# Patient Record
Sex: FEMALE | Race: WHITE | NOT HISPANIC OR LATINO | URBAN - METROPOLITAN AREA
[De-identification: names, ages, dates, MRNs, and addresses within clinical notes are randomized per-mention and may not be internally consistent; named-entity substitution may affect disease eponyms.]

---

## 2017-08-06 ENCOUNTER — INPATIENT (INPATIENT)
Facility: HOSPITAL | Age: 82
LOS: 2 days | Discharge: NURSING, REHAB OR LONG-TERM CARE | DRG: 872 | End: 2017-08-09
Admitting: HOSPITALIST
Payer: MEDICARE

## 2017-08-06 VITALS
HEART RATE: 102 BPM | SYSTOLIC BLOOD PRESSURE: 158 MMHG | RESPIRATION RATE: 20 BRPM | HEIGHT: 65 IN | TEMPERATURE: 101 F | OXYGEN SATURATION: 96 % | DIASTOLIC BLOOD PRESSURE: 91 MMHG | WEIGHT: 130.07 LBS

## 2017-08-06 DIAGNOSIS — F05 DELIRIUM DUE TO KNOWN PHYSIOLOGICAL CONDITION: ICD-10-CM

## 2017-08-06 DIAGNOSIS — N30.01 ACUTE CYSTITIS WITH HEMATURIA: ICD-10-CM

## 2017-08-06 DIAGNOSIS — A41.9 SEPSIS, UNSPECIFIED ORGANISM: ICD-10-CM

## 2017-08-06 LAB
ALBUMIN SERPL ELPH-MCNC: 4.2 G/DL — SIGNIFICANT CHANGE UP (ref 3.3–5.2)
ALP SERPL-CCNC: 89 U/L — SIGNIFICANT CHANGE UP (ref 40–120)
ALT FLD-CCNC: 20 U/L — SIGNIFICANT CHANGE UP
ANION GAP SERPL CALC-SCNC: 16 MMOL/L — SIGNIFICANT CHANGE UP (ref 5–17)
APPEARANCE CSF: CLEAR — SIGNIFICANT CHANGE UP
APPEARANCE UR: ABNORMAL
APTT BLD: 29.9 SEC — SIGNIFICANT CHANGE UP (ref 27.5–37.4)
AST SERPL-CCNC: 46 U/L — HIGH
BACTERIA # UR AUTO: ABNORMAL
BASOPHILS # BLD AUTO: 0 K/UL — SIGNIFICANT CHANGE UP (ref 0–0.2)
BASOPHILS NFR BLD AUTO: 0 % — SIGNIFICANT CHANGE UP (ref 0–2)
BILIRUB SERPL-MCNC: 0.5 MG/DL — SIGNIFICANT CHANGE UP (ref 0.4–2)
BILIRUB UR-MCNC: NEGATIVE — SIGNIFICANT CHANGE UP
BUN SERPL-MCNC: 14 MG/DL — SIGNIFICANT CHANGE UP (ref 8–20)
CALCIUM SERPL-MCNC: 9.3 MG/DL — SIGNIFICANT CHANGE UP (ref 8.6–10.2)
CHLORIDE SERPL-SCNC: 96 MMOL/L — LOW (ref 98–107)
CO2 SERPL-SCNC: 25 MMOL/L — SIGNIFICANT CHANGE UP (ref 22–29)
COLOR CSF: SIGNIFICANT CHANGE UP
COLOR SPEC: YELLOW — SIGNIFICANT CHANGE UP
CREAT SERPL-MCNC: 0.72 MG/DL — SIGNIFICANT CHANGE UP (ref 0.5–1.3)
DIFF PNL FLD: ABNORMAL
EPI CELLS # UR: SIGNIFICANT CHANGE UP
GLUCOSE CSF-MCNC: 78 MG/DL — HIGH (ref 40–70)
GLUCOSE SERPL-MCNC: 153 MG/DL — HIGH (ref 70–115)
GLUCOSE UR QL: 50 MG/DL
HCT VFR BLD CALC: 41.4 % — SIGNIFICANT CHANGE UP (ref 37–47)
HGB BLD-MCNC: 13.5 G/DL — SIGNIFICANT CHANGE UP (ref 12–16)
INR BLD: 0.95 RATIO — SIGNIFICANT CHANGE UP (ref 0.88–1.16)
KETONES UR-MCNC: ABNORMAL
LACTATE BLDV-MCNC: 1.4 MMOL/L — SIGNIFICANT CHANGE UP (ref 0.5–2)
LEUKOCYTE ESTERASE UR-ACNC: ABNORMAL
LYMPHOCYTES # BLD AUTO: 0.8 K/UL — LOW (ref 1–4.8)
LYMPHOCYTES # BLD AUTO: 8 % — LOW (ref 20–55)
LYMPHOCYTES # CSF: SIGNIFICANT CHANGE UP % (ref 40–80)
MANUAL DIF COMMENT BLD-IMP: SIGNIFICANT CHANGE UP
MCHC RBC-ENTMCNC: 28.8 PG — SIGNIFICANT CHANGE UP (ref 27–31)
MCHC RBC-ENTMCNC: 32.6 G/DL — SIGNIFICANT CHANGE UP (ref 32–36)
MCV RBC AUTO: 88.5 FL — SIGNIFICANT CHANGE UP (ref 81–99)
MONOCYTES # BLD AUTO: 0.1 K/UL — SIGNIFICANT CHANGE UP (ref 0–0.8)
MONOCYTES NFR BLD AUTO: 2 % — LOW (ref 3–10)
NEUTROPHILS # BLD AUTO: 9.4 K/UL — HIGH (ref 1.8–8)
NEUTROPHILS # CSF: SIGNIFICANT CHANGE UP %
NEUTROPHILS NFR BLD AUTO: 90 % — HIGH (ref 37–73)
NITRITE UR-MCNC: NEGATIVE — SIGNIFICANT CHANGE UP
NRBC NFR CSF: 0 /UL — SIGNIFICANT CHANGE UP (ref 0–5)
PH UR: 8 — SIGNIFICANT CHANGE UP (ref 5–8)
PLAT MORPH BLD: NORMAL — SIGNIFICANT CHANGE UP
PLATELET # BLD AUTO: 220 K/UL — SIGNIFICANT CHANGE UP (ref 150–400)
POTASSIUM SERPL-MCNC: 4.4 MMOL/L — SIGNIFICANT CHANGE UP (ref 3.5–5.3)
POTASSIUM SERPL-SCNC: 4.4 MMOL/L — SIGNIFICANT CHANGE UP (ref 3.5–5.3)
PROT CSF-MCNC: 43 MG/DL — SIGNIFICANT CHANGE UP (ref 15–45)
PROT SERPL-MCNC: 8.2 G/DL — SIGNIFICANT CHANGE UP (ref 6.6–8.7)
PROT UR-MCNC: 30 MG/DL
PROTHROM AB SERPL-ACNC: 10.4 SEC — SIGNIFICANT CHANGE UP (ref 9.8–12.7)
RBC # BLD: 4.68 M/UL — SIGNIFICANT CHANGE UP (ref 4.4–5.2)
RBC # CSF: 1 /CMM — SIGNIFICANT CHANGE UP (ref 0–1)
RBC # FLD: 13.4 % — SIGNIFICANT CHANGE UP (ref 11–15.6)
RBC BLD AUTO: SIGNIFICANT CHANGE UP
RBC CASTS # UR COMP ASSIST: >50 /HPF (ref 0–4)
SODIUM SERPL-SCNC: 137 MMOL/L — SIGNIFICANT CHANGE UP (ref 135–145)
SP GR SPEC: 1.01 — SIGNIFICANT CHANGE UP (ref 1.01–1.02)
TUBE TYPE: SIGNIFICANT CHANGE UP
UROBILINOGEN FLD QL: NEGATIVE MG/DL — SIGNIFICANT CHANGE UP
WBC # BLD: 10.4 K/UL — SIGNIFICANT CHANGE UP (ref 4.8–10.8)
WBC # FLD AUTO: 10.4 K/UL — SIGNIFICANT CHANGE UP (ref 4.8–10.8)
WBC UR QL: ABNORMAL

## 2017-08-06 PROCEDURE — 62270 DX LMBR SPI PNXR: CPT

## 2017-08-06 PROCEDURE — 99285 EMERGENCY DEPT VISIT HI MDM: CPT | Mod: 25

## 2017-08-06 PROCEDURE — 93010 ELECTROCARDIOGRAM REPORT: CPT

## 2017-08-06 PROCEDURE — 70450 CT HEAD/BRAIN W/O DYE: CPT | Mod: 26

## 2017-08-06 PROCEDURE — 71010: CPT | Mod: 26

## 2017-08-06 PROCEDURE — 99223 1ST HOSP IP/OBS HIGH 75: CPT

## 2017-08-06 RX ORDER — ACETAMINOPHEN 500 MG
650 TABLET ORAL ONCE
Qty: 0 | Refills: 0 | Status: COMPLETED | OUTPATIENT
Start: 2017-08-06 | End: 2017-08-06

## 2017-08-06 RX ORDER — SODIUM CHLORIDE 9 MG/ML
3 INJECTION INTRAMUSCULAR; INTRAVENOUS; SUBCUTANEOUS EVERY 8 HOURS
Qty: 0 | Refills: 0 | Status: DISCONTINUED | OUTPATIENT
Start: 2017-08-06 | End: 2017-08-06

## 2017-08-06 RX ORDER — LACTOBACILLUS ACIDOPHILUS 100MM CELL
1 CAPSULE ORAL
Qty: 0 | Refills: 0 | Status: DISCONTINUED | OUTPATIENT
Start: 2017-08-06 | End: 2017-08-09

## 2017-08-06 RX ORDER — ACETAMINOPHEN 500 MG
650 TABLET ORAL EVERY 6 HOURS
Qty: 0 | Refills: 0 | Status: DISCONTINUED | OUTPATIENT
Start: 2017-08-06 | End: 2017-08-09

## 2017-08-06 RX ORDER — SODIUM CHLORIDE 9 MG/ML
1000 INJECTION, SOLUTION INTRAVENOUS
Qty: 0 | Refills: 0 | Status: DISCONTINUED | OUTPATIENT
Start: 2017-08-06 | End: 2017-08-07

## 2017-08-06 RX ORDER — SODIUM CHLORIDE 9 MG/ML
2000 INJECTION INTRAMUSCULAR; INTRAVENOUS; SUBCUTANEOUS ONCE
Qty: 0 | Refills: 0 | Status: COMPLETED | OUTPATIENT
Start: 2017-08-06 | End: 2017-08-06

## 2017-08-06 RX ORDER — CEFTRIAXONE 500 MG/1
1 INJECTION, POWDER, FOR SOLUTION INTRAMUSCULAR; INTRAVENOUS ONCE
Qty: 0 | Refills: 0 | Status: COMPLETED | OUTPATIENT
Start: 2017-08-06 | End: 2017-08-06

## 2017-08-06 RX ORDER — ACETAMINOPHEN 500 MG
650 TABLET ORAL ONCE
Qty: 0 | Refills: 0 | Status: DISCONTINUED | OUTPATIENT
Start: 2017-08-06 | End: 2017-08-07

## 2017-08-06 RX ORDER — CEFTRIAXONE 500 MG/1
1 INJECTION, POWDER, FOR SOLUTION INTRAMUSCULAR; INTRAVENOUS EVERY 24 HOURS
Qty: 0 | Refills: 0 | Status: DISCONTINUED | OUTPATIENT
Start: 2017-08-06 | End: 2017-08-09

## 2017-08-06 RX ADMIN — Medication 650 MILLIGRAM(S): at 17:51

## 2017-08-06 RX ADMIN — CEFTRIAXONE 100 GRAM(S): 500 INJECTION, POWDER, FOR SOLUTION INTRAMUSCULAR; INTRAVENOUS at 17:53

## 2017-08-06 RX ADMIN — SODIUM CHLORIDE 2000 MILLILITER(S): 9 INJECTION INTRAMUSCULAR; INTRAVENOUS; SUBCUTANEOUS at 17:51

## 2017-08-06 NOTE — H&P ADULT - PROBLEM SELECTOR PLAN 1
At this time LP negative, and only possible source at this time appears to be the Urine. Will cont. Rocephin, f/u blood urine cx, cont. probiotics, monitor WBC/temp curve. DVT-P, fall risk, PT for program. DNR/DNI at family request.

## 2017-08-06 NOTE — ED PROVIDER NOTE - MUSCULOSKELETAL, MLM
Spine appears normal, range of motion is not limited, no muscle or joint tenderness; contusion to left anterior shin

## 2017-08-06 NOTE — H&P ADULT - HISTORY OF PRESENT ILLNESS
89 y/o female from home with family. Patient is currently non-verbal and history obtained from Family. Patient at baseline ambulates with walker and is able to communicate with family. She is a retired OR Nurse and has been doing well up until this morning. Patient was noted to have increased confusion after waking up and was sweating. She was noted to have fever and over the course of the day she was getting more confused to the point of now being non-verbal. No reports of photophobia, or neck stiffness. No recent reports of dysuria or frequency. No sick contacts or travel. No changes in medications. In the ED patient with high grade temp, toxic appearing. No appreciable photophobia or neck pain. Patient with no clear source of infection. U/A weakly positive and patient exam and neuro exam not c/w typical UTI. Patient with concern for meningitis s/p LP which was negative. Will be admitted for f/u of cultures, empiric treatment of possible UTI.

## 2017-08-06 NOTE — ED ADULT TRIAGE NOTE - CHIEF COMPLAINT QUOTE
pt lyssa from home, accompanied by son, son states she is weak and not acting herself pt lyssa from home, accompanied by son, son states she is weak and not acting herself, febrile and tachycardic

## 2017-08-06 NOTE — ED PROVIDER NOTE - OBJECTIVE STATEMENT
89 yo female hx of elevated cholesterol; BIB family for 1 day of increasing lethargy, not speaking clearly, not responding like normal; felt warm; rectal temp in triage 101.8; patient delirius and unable to contribute further to hx.

## 2017-08-06 NOTE — ED ADULT NURSE NOTE - OBJECTIVE STATEMENT
pt brought in by ambulance, as per son pt has not been acting herself, she is usually AOX3 but today started with c/o weakness, and being very quite, now pt is nonverbal. Sepsis code called, MD Ledbetter at bedside.

## 2017-08-06 NOTE — ED PROVIDER NOTE - PROGRESS NOTE DETAILS
Lengthy d/w with family at bedside, 3 sons; patient is DNR/DNI as per advanced directives.  Additionaly, discussed at length consideration for lumbar puncture given no obvious source of infection; at this time family wishes to decline LP after further discussion with hospitalist, agreeable to LP; consent signed by son, in chart; see procedure note.

## 2017-08-06 NOTE — ED ADULT NURSE NOTE - CHIEF COMPLAINT QUOTE
pt lyssa from home, accompanied by son, son states she is weak and not acting herself, febrile and tachycardic

## 2017-08-07 DIAGNOSIS — R26.2 DIFFICULTY IN WALKING, NOT ELSEWHERE CLASSIFIED: ICD-10-CM

## 2017-08-07 DIAGNOSIS — N39.0 URINARY TRACT INFECTION, SITE NOT SPECIFIED: ICD-10-CM

## 2017-08-07 LAB
BASOPHILS # BLD AUTO: 0 K/UL — SIGNIFICANT CHANGE UP (ref 0–0.2)
BASOPHILS NFR BLD AUTO: 0.1 % — SIGNIFICANT CHANGE UP (ref 0–2)
EOSINOPHIL # BLD AUTO: 0 K/UL — SIGNIFICANT CHANGE UP (ref 0–0.5)
EOSINOPHIL NFR BLD AUTO: 0.5 % — SIGNIFICANT CHANGE UP (ref 0–6)
GRAM STN FLD: SIGNIFICANT CHANGE UP
HCT VFR BLD CALC: 38.2 % — SIGNIFICANT CHANGE UP (ref 37–47)
HGB BLD-MCNC: 12.2 G/DL — SIGNIFICANT CHANGE UP (ref 12–16)
LYMPHOCYTES # BLD AUTO: 1.2 K/UL — SIGNIFICANT CHANGE UP (ref 1–4.8)
LYMPHOCYTES # BLD AUTO: 15.1 % — LOW (ref 20–55)
MCHC RBC-ENTMCNC: 28.4 PG — SIGNIFICANT CHANGE UP (ref 27–31)
MCHC RBC-ENTMCNC: 31.9 G/DL — LOW (ref 32–36)
MCV RBC AUTO: 89 FL — SIGNIFICANT CHANGE UP (ref 81–99)
MONOCYTES # BLD AUTO: 1.1 K/UL — HIGH (ref 0–0.8)
MONOCYTES NFR BLD AUTO: 13.6 % — HIGH (ref 3–10)
NEUTROPHILS # BLD AUTO: 5.5 K/UL — SIGNIFICANT CHANGE UP (ref 1.8–8)
NEUTROPHILS NFR BLD AUTO: 70.4 % — SIGNIFICANT CHANGE UP (ref 37–73)
PLATELET # BLD AUTO: 216 K/UL — SIGNIFICANT CHANGE UP (ref 150–400)
RBC # BLD: 4.29 M/UL — LOW (ref 4.4–5.2)
RBC # FLD: 13.4 % — SIGNIFICANT CHANGE UP (ref 11–15.6)
SPECIMEN SOURCE: SIGNIFICANT CHANGE UP
WBC # BLD: 7.8 K/UL — SIGNIFICANT CHANGE UP (ref 4.8–10.8)
WBC # FLD AUTO: 7.8 K/UL — SIGNIFICANT CHANGE UP (ref 4.8–10.8)

## 2017-08-07 PROCEDURE — 99233 SBSQ HOSP IP/OBS HIGH 50: CPT

## 2017-08-07 RX ADMIN — SODIUM CHLORIDE 100 MILLILITER(S): 9 INJECTION, SOLUTION INTRAVENOUS at 10:43

## 2017-08-07 RX ADMIN — Medication 1 TABLET(S): at 11:12

## 2017-08-07 RX ADMIN — Medication 1 TABLET(S): at 10:09

## 2017-08-07 RX ADMIN — Medication 1 TABLET(S): at 18:15

## 2017-08-07 RX ADMIN — CEFTRIAXONE 100 GRAM(S): 500 INJECTION, POWDER, FOR SOLUTION INTRAMUSCULAR; INTRAVENOUS at 18:14

## 2017-08-07 RX ADMIN — SODIUM CHLORIDE 100 MILLILITER(S): 9 INJECTION, SOLUTION INTRAVENOUS at 00:35

## 2017-08-07 NOTE — PHYSICAL THERAPY INITIAL EVALUATION ADULT - PLANNED THERAPY INTERVENTIONS, PT EVAL
gait training/postural re-education/strengthening/stairs/balance training/bed mobility training/transfer training

## 2017-08-07 NOTE — SWALLOW BEDSIDE ASSESSMENT ADULT - ASR SWALLOW ASPIRATION MONITOR
gurgly voice/pneumonia/cough/change of breathing pattern/position upright (89Y)/oral hygiene/throat clearing/upper respiratory infection/fever

## 2017-08-07 NOTE — PHYSICAL THERAPY INITIAL EVALUATION ADULT - IMPAIRMENTS CONTRIBUTING TO GAIT DEVIATIONS, PT EVAL
forward lean, requires verbal cues to stand up straight and keep feet within RW./impaired postural control/decreased strength/impaired balance

## 2017-08-07 NOTE — PROGRESS NOTE ADULT - SUBJECTIVE AND OBJECTIVE BOX
INTERVAL HPI/OVERNIGHT EVENTS:    CC: sepsis secondary to UTI, difficulty walking    Chart and course reviewed, more alert, states she feels better, had some burning urination for few days, states its 1971.    Vital Signs Last 24 Hrs  T(C): 36.9 (07 Aug 2017 15:11), Max: 38.4 (06 Aug 2017 17:18)  T(F): 98.4 (07 Aug 2017 15:11), Max: 101.2 (06 Aug 2017 17:18)  HR: 66 (07 Aug 2017 15:11) (61 - 112)  BP: 143/65 (07 Aug 2017 15:11) (107/59 - 160/88)  BP(mean): 77 (07 Aug 2017 02:57) (77 - 85)  RR: 21 (07 Aug 2017 15:11) (18 - 30)  SpO2: 98% (07 Aug 2017 15:11) (90% - 100%)    PHYSICAL EXAM:    GENERAL: Not in distress, alert, oriented to place and person  CHEST/LUNG:b/l air entry, clear  HEART: Regular  ABDOMEN: Soft, BS+  EXTREMITIES:  No edema, tenderness.    MEDICATIONS  (STANDING):  dextrose 5% + sodium chloride 0.9%. 1000 milliLiter(s) (100 mL/Hr) IV Continuous <Continuous>  cefTRIAXone   IVPB 1 Gram(s) IV Intermittent every 24 hours  lactobacillus acidophilus 1 Tablet(s) Oral two times a day with meals  multivitamin 1 Tablet(s) Oral daily    MEDICATIONS  (PRN):  acetaminophen   Tablet 650 milliGRAM(s) Oral every 6 hours PRN For Temp greater than 38.5 C (101.3 F)      Allergies    No Known Allergies    Intolerances          LABS:                          12.2   7.8   )-----------( 216      ( 07 Aug 2017 05:50 )             38.2     08-06    137  |  96<L>  |  14.0  ----------------------------<  153<H>  4.4   |  25.0  |  0.72    Ca    9.3      06 Aug 2017 17:43    TPro  8.2  /  Alb  4.2  /  TBili  0.5  /  DBili  x   /  AST  46<H>  /  ALT  20  /  AlkPhos  89  08-06    PT/INR - ( 06 Aug 2017 17:43 )   PT: 10.4 sec;   INR: 0.95 ratio         PTT - ( 06 Aug 2017 17:43 )  PTT:29.9 sec  Urinalysis Basic - ( 06 Aug 2017 17:51 )    Color: Yellow / Appearance: x / S.010 / pH: x  Gluc: x / Ketone: Small  / Bili: Negative / Urobili: Negative mg/dL   Blood: x / Protein: 30 mg/dL / Nitrite: Negative   Leuk Esterase: Trace / RBC: >50 /HPF / WBC 6-10   Sq Epi: x / Non Sq Epi: Few / Bacteria: Many        RADIOLOGY & ADDITIONAL TESTS:

## 2017-08-07 NOTE — ED ADULT NURSE REASSESSMENT NOTE - NS ED NURSE REASSESS COMMENT FT1
son at bedside, patient refusing to speak at the moment, plan of care explained to family, verbalized understanding.
Pt improvement in mental status. Pt is now a&ox3, speaking coherently, and following commands. Pt vitals are as charted. Dr. Sandoval made aware of pt's improvement. Will continue to monitor.

## 2017-08-07 NOTE — PHYSICAL THERAPY INITIAL EVALUATION ADULT - ADDITIONAL COMMENTS
Pt lives in a house with 4 steps to enter (+) HR and 8 steps to main level (+) HR. Per son - pt uses RW and cane interchangeably. Pt's son, whom she lives with, travels for work. Pt does not leave the house without assistance from family. She has an aide once a week for laundry/cleaning/cooking.

## 2017-08-08 DIAGNOSIS — E83.39 OTHER DISORDERS OF PHOSPHORUS METABOLISM: ICD-10-CM

## 2017-08-08 LAB
-  AMIKACIN: SIGNIFICANT CHANGE UP
-  AMPICILLIN/SULBACTAM: SIGNIFICANT CHANGE UP
-  AMPICILLIN: SIGNIFICANT CHANGE UP
-  AZTREONAM: SIGNIFICANT CHANGE UP
-  CEFAZOLIN: SIGNIFICANT CHANGE UP
-  CEFEPIME: SIGNIFICANT CHANGE UP
-  CEFOXITIN: SIGNIFICANT CHANGE UP
-  CEFTAZIDIME: SIGNIFICANT CHANGE UP
-  CEFTRIAXONE: SIGNIFICANT CHANGE UP
-  CIPROFLOXACIN: SIGNIFICANT CHANGE UP
-  ERTAPENEM: SIGNIFICANT CHANGE UP
-  GENTAMICIN: SIGNIFICANT CHANGE UP
-  IMIPENEM: SIGNIFICANT CHANGE UP
-  LEVOFLOXACIN: SIGNIFICANT CHANGE UP
-  MEROPENEM: SIGNIFICANT CHANGE UP
-  NITROFURANTOIN: SIGNIFICANT CHANGE UP
-  PIPERACILLIN/TAZOBACTAM: SIGNIFICANT CHANGE UP
-  TOBRAMYCIN: SIGNIFICANT CHANGE UP
-  TRIMETHOPRIM/SULFAMETHOXAZOLE: SIGNIFICANT CHANGE UP
ANION GAP SERPL CALC-SCNC: 11 MMOL/L — SIGNIFICANT CHANGE UP (ref 5–17)
BUN SERPL-MCNC: 10 MG/DL — SIGNIFICANT CHANGE UP (ref 8–20)
CALCIUM SERPL-MCNC: 9.1 MG/DL — SIGNIFICANT CHANGE UP (ref 8.6–10.2)
CHLORIDE SERPL-SCNC: 100 MMOL/L — SIGNIFICANT CHANGE UP (ref 98–107)
CO2 SERPL-SCNC: 28 MMOL/L — SIGNIFICANT CHANGE UP (ref 22–29)
CREAT SERPL-MCNC: 0.65 MG/DL — SIGNIFICANT CHANGE UP (ref 0.5–1.3)
CULTURE RESULTS: SIGNIFICANT CHANGE UP
GLUCOSE SERPL-MCNC: 124 MG/DL — HIGH (ref 70–115)
HCT VFR BLD CALC: 42.7 % — SIGNIFICANT CHANGE UP (ref 37–47)
HGB BLD-MCNC: 13.8 G/DL — SIGNIFICANT CHANGE UP (ref 12–16)
LABORATORY COMMENT REPORT: SIGNIFICANT CHANGE UP
MAGNESIUM SERPL-MCNC: 2.3 MG/DL — SIGNIFICANT CHANGE UP (ref 1.6–2.6)
MCHC RBC-ENTMCNC: 28.6 PG — SIGNIFICANT CHANGE UP (ref 27–31)
MCHC RBC-ENTMCNC: 32.3 G/DL — SIGNIFICANT CHANGE UP (ref 32–36)
MCV RBC AUTO: 88.6 FL — SIGNIFICANT CHANGE UP (ref 81–99)
METHOD TYPE: SIGNIFICANT CHANGE UP
ORGANISM # SPEC MICROSCOPIC CNT: SIGNIFICANT CHANGE UP
ORGANISM # SPEC MICROSCOPIC CNT: SIGNIFICANT CHANGE UP
PHOSPHATE SERPL-MCNC: 1.8 MG/DL — LOW (ref 2.4–4.7)
PLATELET # BLD AUTO: 210 K/UL — SIGNIFICANT CHANGE UP (ref 150–400)
POTASSIUM SERPL-MCNC: 3.9 MMOL/L — SIGNIFICANT CHANGE UP (ref 3.5–5.3)
POTASSIUM SERPL-SCNC: 3.9 MMOL/L — SIGNIFICANT CHANGE UP (ref 3.5–5.3)
RBC # BLD: 4.82 M/UL — SIGNIFICANT CHANGE UP (ref 4.4–5.2)
RBC # FLD: 13.3 % — SIGNIFICANT CHANGE UP (ref 11–15.6)
SODIUM SERPL-SCNC: 139 MMOL/L — SIGNIFICANT CHANGE UP (ref 135–145)
SOURCE HSV 1/2: SIGNIFICANT CHANGE UP
SPECIMEN SOURCE: SIGNIFICANT CHANGE UP
WBC # BLD: 6 K/UL — SIGNIFICANT CHANGE UP (ref 4.8–10.8)
WBC # FLD AUTO: 6 K/UL — SIGNIFICANT CHANGE UP (ref 4.8–10.8)

## 2017-08-08 PROCEDURE — 99233 SBSQ HOSP IP/OBS HIGH 50: CPT

## 2017-08-08 RX ORDER — POTASSIUM PHOSPHATE, MONOBASIC POTASSIUM PHOSPHATE, DIBASIC 236; 224 MG/ML; MG/ML
15 INJECTION, SOLUTION INTRAVENOUS ONCE
Qty: 0 | Refills: 0 | Status: COMPLETED | OUTPATIENT
Start: 2017-08-08 | End: 2017-08-08

## 2017-08-08 RX ADMIN — CEFTRIAXONE 100 GRAM(S): 500 INJECTION, POWDER, FOR SOLUTION INTRAMUSCULAR; INTRAVENOUS at 17:35

## 2017-08-08 RX ADMIN — Medication 1 TABLET(S): at 13:00

## 2017-08-08 RX ADMIN — Medication 1 TABLET(S): at 08:04

## 2017-08-08 RX ADMIN — POTASSIUM PHOSPHATE, MONOBASIC POTASSIUM PHOSPHATE, DIBASIC 62.5 MILLIMOLE(S): 236; 224 INJECTION, SOLUTION INTRAVENOUS at 17:36

## 2017-08-08 RX ADMIN — Medication 1 TABLET(S): at 17:36

## 2017-08-08 NOTE — PROGRESS NOTE ADULT - SUBJECTIVE AND OBJECTIVE BOX
INTERVAL HPI/OVERNIGHT EVENTS:    CC: sepsis and altered mental status secondary to UTI improving    No overnight events, denies complaints. Pleasant, mild confusion.    Vital Signs Last 24 Hrs  T(C): 36.7 (08 Aug 2017 08:11), Max: 36.9 (07 Aug 2017 15:11)  T(F): 98 (08 Aug 2017 08:11), Max: 98.4 (07 Aug 2017 15:11)  HR: 98 (08 Aug 2017 08:11) (65 - 98)  BP: 104/56 (08 Aug 2017 08:11) (104/56 - 145/68)  BP(mean): --  RR: 18 (08 Aug 2017 08:11) (17 - 21)  SpO2: 99% (08 Aug 2017 08:11) (98% - 100%)    PHYSICAL EXAM:    GENERAL: Not in distress, alert, confused, oriented to place and person, states it is 1971  CHEST/LUNG: b/l air entry, clear  HEART: Regular   ABDOMEN: Soft, BS+  EXTREMITIES: No edema, tenderness    MEDICATIONS  (STANDING):  cefTRIAXone   IVPB 1 Gram(s) IV Intermittent every 24 hours  lactobacillus acidophilus 1 Tablet(s) Oral two times a day with meals  multivitamin 1 Tablet(s) Oral daily    MEDICATIONS  (PRN):  acetaminophen   Tablet 650 milliGRAM(s) Oral every 6 hours PRN For Temp greater than 38.5 C (101.3 F)      Allergies    No Known Allergies    Intolerances          LABS:                          13.8   6.0   )-----------( 210      ( 08 Aug 2017 11:57 )             42.7     08-    139  |  100  |  10.0  ----------------------------<  124<H>  3.9   |  28.0  |  0.65    Ca    9.1      08 Aug 2017 11:56  Phos  1.8     08-  Mg     2.3     -    TPro  8.2  /  Alb  4.2  /  TBili  0.5  /  DBili  x   /  AST  46<H>  /  ALT  20  /  AlkPhos  89  08-06    PT/INR - ( 06 Aug 2017 17:43 )   PT: 10.4 sec;   INR: 0.95 ratio         PTT - ( 06 Aug 2017 17:43 )  PTT:29.9 sec  Urinalysis Basic - ( 06 Aug 2017 17:51 )    Color: Yellow / Appearance: x / S.010 / pH: x  Gluc: x / Ketone: Small  / Bili: Negative / Urobili: Negative mg/dL   Blood: x / Protein: 30 mg/dL / Nitrite: Negative   Leuk Esterase: Trace / RBC: >50 /HPF / WBC 6-10   Sq Epi: x / Non Sq Epi: Few / Bacteria: Many        RADIOLOGY & ADDITIONAL TESTS:

## 2017-08-08 NOTE — PROGRESS NOTE ADULT - ASSESSMENT
89 yr old female with hyperlipidemia, lives alone admitted with weakness, lethargy and confusion. She was febrile in the ED, initial concern for meningitis was ruled out with LP. UA was weakly positive for infection, hence started on IV Ceftriaxone. Urine cultures grew gram negative rods. Her mental status improved. PT evaluation was done, advised MELIZA. Goals of care discussed with son, stated she is DNR.

## 2017-08-09 ENCOUNTER — TRANSCRIPTION ENCOUNTER (OUTPATIENT)
Age: 82
End: 2017-08-09

## 2017-08-09 VITALS
RESPIRATION RATE: 18 BRPM | SYSTOLIC BLOOD PRESSURE: 113 MMHG | OXYGEN SATURATION: 97 % | TEMPERATURE: 99 F | HEART RATE: 76 BPM | DIASTOLIC BLOOD PRESSURE: 66 MMHG

## 2017-08-09 LAB
ANION GAP SERPL CALC-SCNC: 13 MMOL/L — SIGNIFICANT CHANGE UP (ref 5–17)
BUN SERPL-MCNC: 18 MG/DL — SIGNIFICANT CHANGE UP (ref 8–20)
CALCIUM SERPL-MCNC: 8.6 MG/DL — SIGNIFICANT CHANGE UP (ref 8.6–10.2)
CHLORIDE SERPL-SCNC: 102 MMOL/L — SIGNIFICANT CHANGE UP (ref 98–107)
CO2 SERPL-SCNC: 23 MMOL/L — SIGNIFICANT CHANGE UP (ref 22–29)
CREAT SERPL-MCNC: 0.75 MG/DL — SIGNIFICANT CHANGE UP (ref 0.5–1.3)
GLUCOSE SERPL-MCNC: 106 MG/DL — SIGNIFICANT CHANGE UP (ref 70–115)
HCT VFR BLD CALC: 39.7 % — SIGNIFICANT CHANGE UP (ref 37–47)
HGB BLD-MCNC: 12.8 G/DL — SIGNIFICANT CHANGE UP (ref 12–16)
MAGNESIUM SERPL-MCNC: 2.3 MG/DL — SIGNIFICANT CHANGE UP (ref 1.6–2.6)
MCHC RBC-ENTMCNC: 28.3 PG — SIGNIFICANT CHANGE UP (ref 27–31)
MCHC RBC-ENTMCNC: 32.2 G/DL — SIGNIFICANT CHANGE UP (ref 32–36)
MCV RBC AUTO: 87.6 FL — SIGNIFICANT CHANGE UP (ref 81–99)
PHOSPHATE SERPL-MCNC: 3.8 MG/DL — SIGNIFICANT CHANGE UP (ref 2.4–4.7)
PLATELET # BLD AUTO: 225 K/UL — SIGNIFICANT CHANGE UP (ref 150–400)
POTASSIUM SERPL-MCNC: 4.3 MMOL/L — SIGNIFICANT CHANGE UP (ref 3.5–5.3)
POTASSIUM SERPL-SCNC: 4.3 MMOL/L — SIGNIFICANT CHANGE UP (ref 3.5–5.3)
RBC # BLD: 4.53 M/UL — SIGNIFICANT CHANGE UP (ref 4.4–5.2)
RBC # FLD: 13.3 % — SIGNIFICANT CHANGE UP (ref 11–15.6)
SODIUM SERPL-SCNC: 138 MMOL/L — SIGNIFICANT CHANGE UP (ref 135–145)
WBC # BLD: 6.5 K/UL — SIGNIFICANT CHANGE UP (ref 4.8–10.8)
WBC # FLD AUTO: 6.5 K/UL — SIGNIFICANT CHANGE UP (ref 4.8–10.8)

## 2017-08-09 PROCEDURE — 99239 HOSP IP/OBS DSCHRG MGMT >30: CPT

## 2017-08-09 RX ORDER — ACETAMINOPHEN 500 MG
2 TABLET ORAL
Qty: 0 | Refills: 0 | COMMUNITY
Start: 2017-08-09

## 2017-08-09 RX ORDER — LACTOBACILLUS ACIDOPHILUS 100MM CELL
1 CAPSULE ORAL
Qty: 0 | Refills: 0 | COMMUNITY
Start: 2017-08-09

## 2017-08-09 RX ADMIN — Medication 1 TABLET(S): at 11:54

## 2017-08-09 RX ADMIN — Medication 1 TABLET(S): at 07:52

## 2017-08-09 NOTE — DISCHARGE NOTE ADULT - MEDICATION SUMMARY - MEDICATIONS TO TAKE
I will START or STAY ON the medications listed below when I get home from the hospital:    aspirin 81 mg oral tablet  -- 1 tab(s) by mouth once a day  -- Indication: For High cholesterol    acetaminophen 325 mg oral tablet  -- 2 tab(s) by mouth every 6 hours, As needed, For Temp greater than 38.5 C (101.3 F)  -- Indication: For fever    simvastatin 40 mg oral tablet  -- 1 tab(s) by mouth once a day (at bedtime)  -- Indication: For High cholesterol    Vantin  -- 200 milligram(s) by mouth once a day for 5 days  -- Indication: For Acute cystitis with hematuria    lactobacillus acidophilus oral capsule  -- 1 cap(s) by mouth once a day for 10 days  -- Indication: For probiotic    Multiple Vitamins oral tablet  -- 1 tab(s) by mouth once a day  -- Indication: For Supplement    Ocuvite Eye + Multi oral tablet  -- 1 tab(s) by mouth once a day  -- Indication: For Supplement

## 2017-08-09 NOTE — DISCHARGE NOTE ADULT - CARE PLAN
Principal Discharge DX:	Acute cystitis with hematuria  Goal:	Resolution of infection  Instructions for follow-up, activity and diet:	Complete course of antibiotics.  Secondary Diagnosis:	Difficulty walking  Instructions for follow-up, activity and diet:	Continue PT at [facility].  Secondary Diagnosis:	High cholesterol  Instructions for follow-up, activity and diet:	Continue statin. Principal Discharge DX:	Acute cystitis with hematuria  Goal:	Resolution of infection  Instructions for follow-up, activity and diet:	Complete course of antibiotics.  Secondary Diagnosis:	Difficulty walking  Instructions for follow-up, activity and diet:	Continue PT at [facility].  Secondary Diagnosis:	High cholesterol  Instructions for follow-up, activity and diet:	Continue statin.

## 2017-08-09 NOTE — DISCHARGE NOTE ADULT - PATIENT PORTAL LINK FT
“You can access the [facility] Patient Portal, offered by [facility], by registering with the following website: [email or web address]”

## 2017-08-09 NOTE — PROGRESS NOTE ADULT - ASSESSMENT
89 yr old female with hyperlipidemia, lives alone admitted with weakness, lethargy and confusion. She was febrile in the ED, initial concern for meningitis was ruled out with LP. UA was weakly positive for infection, hence started on IV Ceftriaxone. Urine cultures grew gram negative rods. Her mental status improved. PT evaluation was done, advised MELIZA. Goals of care discussed with son, stated she is DNR. Her urine cultures grew E coli and blood cultures were negative. She improved clinically, family in agreement with MELIZA.

## 2017-08-09 NOTE — PROGRESS NOTE ADULT - PROBLEM SELECTOR PROBLEM 1
Sepsis, due to unspecified organism

## 2017-08-09 NOTE — DISCHARGE NOTE ADULT - HOSPITAL COURSE
89 yr old female with hyperlipidemia, lives alone admitted with weakness, lethargy and confusion. She was febrile in the ED, initial concern for meningitis was ruled out with LP. UA was weakly positive for infection, hence started on IV Ceftriaxone. Urine cultures grew gram negative rods. Her mental status improved. PT evaluation was done, advised [facility]. Goals of care discussed with son, stated she is DNR. Her urine cultures grew E coli and blood cultures were negative. She improved clinically, family in agreement with [facility]. She is stable for discharge to [facility].    Spent > 35 mins in discharge plan and documentation.

## 2017-08-09 NOTE — PROGRESS NOTE ADULT - SUBJECTIVE AND OBJECTIVE BOX
INTERVAL HPI/OVERNIGHT EVENTS:    CC: sepsis secondary to UTI improving    No overnight events, denies complaints.     Vital Signs Last 24 Hrs  T(C): 37.2 (09 Aug 2017 09:47), Max: 37.2 (09 Aug 2017 09:47)  T(F): 98.9 (09 Aug 2017 09:47), Max: 98.9 (09 Aug 2017 09:47)  HR: 76 (09 Aug 2017 09:47) (74 - 98)  BP: 113/66 (09 Aug 2017 09:47) (113/66 - 119/62)  BP(mean): --  RR: 18 (09 Aug 2017 09:47) (17 - 18)  SpO2: 97% (09 Aug 2017 09:47) (97% - 98%)    PHYSICAL EXAM:    GENERAL: not in distress, alert  CHEST/LUNG: b/l air entry  HEART: Regular   ABDOMEN: Soft, BS+  EXTREMITIES:  No edema, tenderness    MEDICATIONS  (STANDING):  cefTRIAXone   IVPB 1 Gram(s) IV Intermittent every 24 hours  lactobacillus acidophilus 1 Tablet(s) Oral two times a day with meals  multivitamin 1 Tablet(s) Oral daily    MEDICATIONS  (PRN):  acetaminophen   Tablet 650 milliGRAM(s) Oral every 6 hours PRN For Temp greater than 38.5 C (101.3 F)      Allergies    No Known Allergies    Intolerances          LABS:                          12.8   6.5   )-----------( 225      ( 09 Aug 2017 07:28 )             39.7     08-09    138  |  102  |  18.0  ----------------------------<  106  4.3   |  23.0  |  0.75    Ca    8.6      09 Aug 2017 07:29  Phos  3.8     08-09  Mg     2.3     08-09            RADIOLOGY & ADDITIONAL TESTS:

## 2017-08-09 NOTE — PROGRESS NOTE ADULT - PROBLEM SELECTOR PLAN 2
Improving, secondary to underlying sepsis. ? dementia.
Resolved.
Likely from underlying sepsis, improving.

## 2017-08-09 NOTE — PROGRESS NOTE ADULT - PROBLEM SELECTOR PLAN 4
Eventual MELIZA.
Needs MELIZA, discussed with social work.
Needs MELIZA, PT evaluation noted. Lives alone.

## 2017-08-09 NOTE — PROGRESS NOTE ADULT - PROBLEM SELECTOR PLAN 1
Secondary to UTI, continue Ceftriaxone, follow up urine cultures.
Will switch to oral antibiotics.
Likely from underlying UTI, continue Ceftriaxone. Follow up urine and blood cultures.

## 2017-08-09 NOTE — PROGRESS NOTE ADULT - ATTENDING COMMENTS
Plan of care discussed with patient's daughter in law. Anticipate discharge to [facility] in 24-48 hrs.
Stable for discharge to [facility], spoke with SW and daughter in law.
Spoke with son Carlos regarding plan of care, he verbalized patient is DNR.

## 2017-08-09 NOTE — PROGRESS NOTE ADULT - PROBLEM SELECTOR PROBLEM 2
Delirium due to another medical condition

## 2017-08-09 NOTE — DISCHARGE NOTE ADULT - ADDITIONAL INSTRUCTIONS
Complete course of antibiotics, continue PT at [facility]. Return to ED should symptoms recur/worsen.

## 2017-08-09 NOTE — DISCHARGE NOTE ADULT - PLAN OF CARE
Resolution of infection Complete course of antibiotics. Continue PT at [facility]. Continue statin.

## 2017-08-10 LAB
CULTURE RESULTS: SIGNIFICANT CHANGE UP
SPECIMEN SOURCE: SIGNIFICANT CHANGE UP

## 2017-08-11 LAB
CULTURE RESULTS: SIGNIFICANT CHANGE UP
CULTURE RESULTS: SIGNIFICANT CHANGE UP
SPECIMEN SOURCE: SIGNIFICANT CHANGE UP
SPECIMEN SOURCE: SIGNIFICANT CHANGE UP

## 2017-09-12 RX ORDER — MULTIVIT-MIN/FERROUS GLUCONATE 9 MG/15 ML
1 LIQUID (ML) ORAL
Qty: 0 | Refills: 0 | COMMUNITY

## 2018-04-23 ENCOUNTER — INPATIENT (INPATIENT)
Facility: HOSPITAL | Age: 83
LOS: 1 days | Discharge: HOME | DRG: 312 | End: 2018-04-25
Attending: HOSPITALIST | Admitting: HOSPITALIST
Payer: MEDICARE

## 2018-04-23 VITALS
OXYGEN SATURATION: 97 % | SYSTOLIC BLOOD PRESSURE: 132 MMHG | WEIGHT: 130.07 LBS | DIASTOLIC BLOOD PRESSURE: 75 MMHG | HEART RATE: 86 BPM | TEMPERATURE: 98 F | HEIGHT: 64 IN | RESPIRATION RATE: 20 BRPM

## 2018-04-23 DIAGNOSIS — R55 SYNCOPE AND COLLAPSE: ICD-10-CM

## 2018-04-23 PROBLEM — E78.00 PURE HYPERCHOLESTEROLEMIA, UNSPECIFIED: Chronic | Status: ACTIVE | Noted: 2017-08-06

## 2018-04-23 LAB
ALBUMIN SERPL ELPH-MCNC: 4.1 G/DL — SIGNIFICANT CHANGE UP (ref 3.3–5.2)
ALP SERPL-CCNC: 85 U/L — SIGNIFICANT CHANGE UP (ref 40–120)
ALT FLD-CCNC: 15 U/L — SIGNIFICANT CHANGE UP
ANION GAP SERPL CALC-SCNC: 10 MMOL/L — SIGNIFICANT CHANGE UP (ref 5–17)
APPEARANCE UR: CLEAR — SIGNIFICANT CHANGE UP
AST SERPL-CCNC: 35 U/L — HIGH
BILIRUB SERPL-MCNC: 0.3 MG/DL — LOW (ref 0.4–2)
BILIRUB UR-MCNC: NEGATIVE — SIGNIFICANT CHANGE UP
BUN SERPL-MCNC: 22 MG/DL — HIGH (ref 8–20)
CALCIUM SERPL-MCNC: 9.6 MG/DL — SIGNIFICANT CHANGE UP (ref 8.6–10.2)
CHLORIDE SERPL-SCNC: 99 MMOL/L — SIGNIFICANT CHANGE UP (ref 98–107)
CK MB CFR SERPL CALC: 3.3 NG/ML — SIGNIFICANT CHANGE UP (ref 0–6.7)
CK SERPL-CCNC: 220 U/L — HIGH (ref 25–170)
CO2 SERPL-SCNC: 29 MMOL/L — SIGNIFICANT CHANGE UP (ref 22–29)
COLOR SPEC: YELLOW — SIGNIFICANT CHANGE UP
CREAT SERPL-MCNC: 0.98 MG/DL — SIGNIFICANT CHANGE UP (ref 0.5–1.3)
DIFF PNL FLD: NEGATIVE — SIGNIFICANT CHANGE UP
EPI CELLS # UR: SIGNIFICANT CHANGE UP
GLUCOSE SERPL-MCNC: 108 MG/DL — SIGNIFICANT CHANGE UP (ref 70–115)
GLUCOSE UR QL: NEGATIVE MG/DL — SIGNIFICANT CHANGE UP
HCT VFR BLD CALC: 39.9 % — SIGNIFICANT CHANGE UP (ref 37–47)
HGB BLD-MCNC: 12.5 G/DL — SIGNIFICANT CHANGE UP (ref 12–16)
KETONES UR-MCNC: NEGATIVE — SIGNIFICANT CHANGE UP
LEUKOCYTE ESTERASE UR-ACNC: ABNORMAL
MAGNESIUM SERPL-MCNC: 2.4 MG/DL — SIGNIFICANT CHANGE UP (ref 1.6–2.6)
MCHC RBC-ENTMCNC: 27.8 PG — SIGNIFICANT CHANGE UP (ref 27–31)
MCHC RBC-ENTMCNC: 31.3 G/DL — LOW (ref 32–36)
MCV RBC AUTO: 88.9 FL — SIGNIFICANT CHANGE UP (ref 81–99)
NITRITE UR-MCNC: NEGATIVE — SIGNIFICANT CHANGE UP
NT-PROBNP SERPL-SCNC: 154 PG/ML — SIGNIFICANT CHANGE UP (ref 0–300)
PH UR: 8 — SIGNIFICANT CHANGE UP (ref 5–8)
PHOSPHATE SERPL-MCNC: 2.8 MG/DL — SIGNIFICANT CHANGE UP (ref 2.4–4.7)
PLATELET # BLD AUTO: 208 K/UL — SIGNIFICANT CHANGE UP (ref 150–400)
POTASSIUM SERPL-MCNC: 4.6 MMOL/L — SIGNIFICANT CHANGE UP (ref 3.5–5.3)
POTASSIUM SERPL-SCNC: 4.6 MMOL/L — SIGNIFICANT CHANGE UP (ref 3.5–5.3)
PROT SERPL-MCNC: 7.8 G/DL — SIGNIFICANT CHANGE UP (ref 6.6–8.7)
PROT UR-MCNC: NEGATIVE MG/DL — SIGNIFICANT CHANGE UP
RBC # BLD: 4.49 M/UL — SIGNIFICANT CHANGE UP (ref 4.4–5.2)
RBC # FLD: 13.7 % — SIGNIFICANT CHANGE UP (ref 11–15.6)
RBC CASTS # UR COMP ASSIST: NEGATIVE /HPF — SIGNIFICANT CHANGE UP (ref 0–4)
SODIUM SERPL-SCNC: 138 MMOL/L — SIGNIFICANT CHANGE UP (ref 135–145)
SP GR SPEC: 1.01 — SIGNIFICANT CHANGE UP (ref 1.01–1.02)
TROPONIN T SERPL-MCNC: <0.01 NG/ML — SIGNIFICANT CHANGE UP (ref 0–0.06)
UROBILINOGEN FLD QL: NEGATIVE MG/DL — SIGNIFICANT CHANGE UP
WBC # BLD: 7.7 K/UL — SIGNIFICANT CHANGE UP (ref 4.8–10.8)
WBC # FLD AUTO: 7.7 K/UL — SIGNIFICANT CHANGE UP (ref 4.8–10.8)
WBC UR QL: SIGNIFICANT CHANGE UP

## 2018-04-23 PROCEDURE — 71045 X-RAY EXAM CHEST 1 VIEW: CPT | Mod: 26

## 2018-04-23 PROCEDURE — 70450 CT HEAD/BRAIN W/O DYE: CPT | Mod: 26

## 2018-04-23 PROCEDURE — 72125 CT NECK SPINE W/O DYE: CPT | Mod: 26

## 2018-04-23 PROCEDURE — 99284 EMERGENCY DEPT VISIT MOD MDM: CPT

## 2018-04-23 RX ORDER — SIMVASTATIN 20 MG/1
40 TABLET, FILM COATED ORAL AT BEDTIME
Qty: 0 | Refills: 0 | Status: DISCONTINUED | OUTPATIENT
Start: 2018-04-23 | End: 2018-04-25

## 2018-04-23 RX ORDER — ATORVASTATIN CALCIUM 80 MG/1
40 TABLET, FILM COATED ORAL AT BEDTIME
Qty: 0 | Refills: 0 | Status: DISCONTINUED | OUTPATIENT
Start: 2018-04-23 | End: 2018-04-23

## 2018-04-23 RX ORDER — ENOXAPARIN SODIUM 100 MG/ML
40 INJECTION SUBCUTANEOUS EVERY 24 HOURS
Qty: 0 | Refills: 0 | Status: DISCONTINUED | OUTPATIENT
Start: 2018-04-23 | End: 2018-04-25

## 2018-04-23 RX ORDER — ASPIRIN/CALCIUM CARB/MAGNESIUM 324 MG
81 TABLET ORAL DAILY
Qty: 0 | Refills: 0 | Status: DISCONTINUED | OUTPATIENT
Start: 2018-04-23 | End: 2018-04-23

## 2018-04-23 RX ORDER — PIPERACILLIN AND TAZOBACTAM 4; .5 G/20ML; G/20ML
200 INJECTION, POWDER, LYOPHILIZED, FOR SOLUTION INTRAVENOUS
Qty: 0 | Refills: 0 | COMMUNITY

## 2018-04-23 RX ORDER — ASPIRIN/CALCIUM CARB/MAGNESIUM 324 MG
81 TABLET ORAL DAILY
Qty: 0 | Refills: 0 | Status: DISCONTINUED | OUTPATIENT
Start: 2018-04-23 | End: 2018-04-25

## 2018-04-23 RX ADMIN — ATORVASTATIN CALCIUM 40 MILLIGRAM(S): 80 TABLET, FILM COATED ORAL at 21:52

## 2018-04-23 RX ADMIN — Medication 81 MILLIGRAM(S): at 21:52

## 2018-04-23 NOTE — H&P ADULT - NSHPREVIEWOFSYSTEMS_GEN_ALL_CORE
ROS:  Constitutional: No fever, weight loss or fatigue  ENMT:  No difficulty hearing, tinnitus, vertigo; No sinus or throat pain  Neck: No pain or stiffness  Respiratory: No cough, wheezing, chills or hemoptysis  Cardiovascular: No chest pain, palpitations, shortness of breath, dizziness or leg swelling  Gastrointestinal: No abdominal or epigastric pain. No nausea, vomiting or hematemesis; No diarrhea or constipation. No melena or hematochezia.  Genitourinary: No dysuria, frequency, hematuria or incontinence  Rectal: No pain, hemorrhoids or incontinence  Neurological: No headaches, memory loss, loss of strength, numbness or tremors  Skin: No itching, burning, rashes or lesions   Endocrine: No heat or cold intolerance; No hair loss  Musculoskeletal: No joint pain or swelling; No muscle, back or extremity pain  Heme/Lymph: No easy bruising or bleeding gums  Psychiatric: No depression, anxiety, mood swings or difficulty sleeping ROS:  Constitutional: No fever, weight loss or fatigue  ENMT:  No difficulty hearing, tinnitus, vertigo; No sinus or throat pain  Neck: No pain or stiffness  Respiratory: No cough, wheezing, chills or hemoptysis  Cardiovascular: No chest pain, palpitations, shortness of breath, dizziness or leg swelling  Gastrointestinal: No abdominal or epigastric pain. No nausea, vomiting or hematemesis; No diarrhea or constipation. No melena or hematochezia.  Genitourinary: No dysuria, frequency, hematuria or incontinence  Neurological: No headaches, memory loss, loss of strength, numbness or tremors  Endocrine: No heat or cold intolerance; No hair loss  Musculoskeletal: + LE weakness. Use walker and cane at baseline.  Heme/Lymph: No easy bruising or bleeding gums  Psychiatric: No depression, anxiety, mood swings or difficulty sleeping

## 2018-04-23 NOTE — ED ADULT NURSE NOTE - FALL HARM RISK
Appt made  
Patient called and stated that she has a sinus infection. She would like to be seen by Dr. Loving. Could Dr. Loving work her in today?  
age(85 years old or older)

## 2018-04-23 NOTE — H&P ADULT - NSHPPHYSICALEXAM_GEN_ALL_CORE
Vital Signs Last 24 Hrs  T(C): 36.7 (23 Apr 2018 19:53), Max: 36.9 (23 Apr 2018 19:20)  T(F): 98.1 (23 Apr 2018 19:53), Max: 98.4 (23 Apr 2018 19:20)  HR: 76 (23 Apr 2018 19:53) (74 - 86)  BP: 157/78 (23 Apr 2018 19:53) (132/75 - 157/78)  RR: 18 (23 Apr 2018 19:53) (18 - 20)  SpO2: 100% (23 Apr 2018 19:53) (97% - 100%)      PHYSICAL EXAM: Vital signs reviewed.  GENERAL: Appears well developed, well nourished alert and cooperative.  HEENT: Head; normocephalic, atraumatic, PERRLA, EOMI  NECK: Supple, no JVD or carotid bruit or thyromegaly.  CARDIOVASCULAR: Normal S1 and S2, No murmur, RRR. No edema  RESPIRATORY: No rales, rhonchi or wheeze. Normal breath sounds bilaterally.  GASTROINTESTINAL: Soft, nontender without mass or organomegaly. Nl BS  EXTREMITIES: No clubbing, cyanosis or edema. No calf tenderness. Distal pulses wnl.   MUSCULOSKELETAL: 5/5 muscle strength in UE and LE.   SKIN: warm and dry with normal turgor.  NEURO: Alert/oriented x 3/normal motor exam. CN 2-12 intact. No pathologic reflexes.    PSYCH: normal affect. Vital Signs Last 24 Hrs  T(C): 36.7 (23 Apr 2018 19:53), Max: 36.9 (23 Apr 2018 19:20)  T(F): 98.1 (23 Apr 2018 19:53), Max: 98.4 (23 Apr 2018 19:20)  HR: 76 (23 Apr 2018 19:53) (74 - 86)  BP: 157/78 (23 Apr 2018 19:53) (132/75 - 157/78)  RR: 18 (23 Apr 2018 19:53) (18 - 20)  SpO2: 100% (23 Apr 2018 19:53) (97% - 100%)      PHYSICAL EXAM: Vital signs reviewed.  GENERAL: Appears well developed, well nourished alert and cooperative.  HEENT: normocephalic, atraumatic, PERRLA, EOMI. +arcus seniles.  NECK: Supple, no JVD or carotid bruit or thyromegaly.  CARDIOVASCULAR: Normal S1 and S2, No murmur, RRR. No edema  RESPIRATORY: No rales, rhonchi or wheeze. Normal breath sounds bilaterally.  GASTROINTESTINAL: Soft, nontender without mass or organomegaly. Nl BS  EXTREMITIES: B/L LE weakness. 4/5 strength on LE. 5/5 on B/L UE. No edema.  MUSCULOSKELETAL: 5/5 muscle strength in UE and LE.   SKIN: warm and dry with normal turgor.  NEURO: Alert/oriented x 3/normal motor exam. CN 2-12 intact. No pathologic reflexes. Impaired gait due to weakness.  PSYCH: normal affect.

## 2018-04-23 NOTE — H&P ADULT - HISTORY OF PRESENT ILLNESS
89 y/o female w h/o HLD 89 y/o female w h/o HLD, UTI who was brought in by her two sons s/p syncope. Pt says earlier today she was bringing out lunch from her fridge when she suddenly fell down. She says she crawled around in attempt to get up but unable to. She then called her sons who worked nearby who rushed back home by 1:24pm in the afternoon. Upon arrival, sons found her lying on floor. They sat her up and then eventually brought her in.    Pt says she does not remember how she fell. She is unsure of any head trauma though did not feel any pain on any part of her body after the fall. She uses a walker and cane at baseline and had a cane with her at time of fall. Sons recall similar episodes in past though she always recalled the falls. Pt remembers being worked up for syncope episodes in the past and follows up with cardio. She says she sees cardiologist Dr. Mccray who did an Echo 2 weeks ago though unsure of result. She never had a colonoscopy but had a mammogram many years ago which was normal.  She has nl appetite and drinks enough water she says.  She denies any dizziness, chest pain, palpitation, SOB, recent illness, sick contact.    Pt is DNR/DNR and form signed at bedside with 2 sons present.

## 2018-04-23 NOTE — ED PROVIDER NOTE - MEDICAL DECISION MAKING DETAILS
89 y/o F p/w syncopal fall. EKG NSR. Plan: labs CT head, CXR and likely admit for syncope evaluation.

## 2018-04-23 NOTE — ED ADULT NURSE REASSESSMENT NOTE - NS ED NURSE REASSESS COMMENT FT1
rcvd pt A&Ox4, laying in stretcher and appears comfortable, MD Harvey at bedside explain plan of care to pt, pt verbalized understanding, resp even and unlabored no distress noted, pt denies any pain and has no complaints, cardiac monitor placed on pt, VSS as documented, family at bedside,  will continue to monitor

## 2018-04-23 NOTE — ED ADULT TRIAGE NOTE - CHIEF COMPLAINT QUOTE
pt BIBA s/p possible syncopal episode, pt is AOX3 reports she was getting her lunch out of the fridge and then she "wound up on the floor." pt with no complaints, even and unlabored resps present. CAVAZOS with strength and purpose. no chest pain/dizziness/SOB.

## 2018-04-23 NOTE — H&P ADULT - NSHPLABSRESULTS_GEN_ALL_CORE
Labs:                        12.5   7.7   )-----------( 208      ( 23 Apr 2018 16:28 )             39.9   04-23    138  |  99  |  22.0<H>  ----------------------------<  108  4.6   |  29.0  |  0.98    Ca    9.6      23 Apr 2018 16:10  Phos  2.8     04-23  Mg     2.4     04-23    TPro  7.8  /  Alb  4.1  /  TBili  0.3<L>  /  DBili  x   /  AST  35<H>  /  ALT  15  /  AlkPhos  85  04-23        Radiology: Images reviewed by me.  *Pull Labs:                        12.5   7.7   )-----------( 208      ( 23 Apr 2018 16:28 )             39.9   04-23    138  |  99  |  22.0<H>  ----------------------------<  108  4.6   |  29.0  |  0.98    Ca    9.6      23 Apr 2018 16:10  Phos  2.8     04-23  Mg     2.4     04-23    TPro  7.8  /  Alb  4.1  /  TBili  0.3<L>  /  DBili  x   /  AST  35<H>  /  ALT  15  /  AlkPhos  85  04-23    CARDIAC MARKERS ( 23 Apr 2018 22:03 )  x     / x     / 220 U/L / x     / 3.3 ng/mL  CARDIAC MARKERS ( 23 Apr 2018 16:10 )  x     / <0.01 ng/mL / x     / x     / x        EKG done shows T wave abnormality.     Radiology:    EXAM:  CT CERVICAL SPINE                          PROCEDURE DATE:  04/23/2018      FINDINGS:  There is degenerative narrowing of the C4-C5, C5-C6 the disc space with   mild right facet arthrosis from C2 through C5. Is mild narrowing of the   left C4-C5, neural foramen by uncovertebral spurs.  There is no fracture, dislocation or pre-vertebral soft tissue swelling.   The cervicomedullary junction is within normal limits.    The visualized bones, joints and soft tissues are within normal limits.    There is no significant spinal canalnarrowing.    The facet and pedicle alignments are unremarkable.    IMPRESSION: Mild degenerative spondylosis as described.  No fracture, dislocation or prevertebral soft tissue swelling of the   cervical spine.          EXAM:  XR CHEST PORTABLE URGENT 1V                          PROCEDURE DATE:  04/23/2018      FINDINGS:   The lungs  are clear.  No pleural abnormality is seen.         The  heart is enlarged in transverse diameter. No hilar mass. Trachea   midline.        Visualized osseous structures are intact.        IMPRESSION:   No evidence of active chest disease.            CT HEad  IMPRESSION:    No acute intracranial findings.    Moderate atrophy and chronic white matter microvascular ischemic changes   as described.   If acute stroke is of clinical concern, MRI with diffusion-weighted   images would be helpful for further characterization.

## 2018-04-23 NOTE — H&P ADULT - ASSESSMENT
Pt is 89 y/o female w h/o HLD, UTI who was brought in by her two sons s/p syncope and admitted for further workup.    1. Syncope    -Admit to medicine resident service under Dr. Colunga to any monitored bed.    -Diet: regular diet    -Activity: ambulate only with assistance and/or walker.    - Orthostatic vitals.    -Trop, CBC, CMP done in E.D. UA shows trace LE. Pt asymptomatic.    -EKG done shows some t wave abnormality.    -CK elevated at 220.      - Aspirin 81mg.    - Cardiology Consult: Dr. Mccray ([facility])    - PT consult    - Follow: AM labs, 2D echo (D/C if able to obtain [place] record), carotid duplex, MRI    2. Hyperlipidemia     -CW home med simvastatin 40mg.    3. Elevated BUN     - Likely secondary to some dehydration. BUN/Creatine at baseline is WNl.     -GFR of 50. Will repeat BMP in Am and reassess.     4. Elevated AST     -Likely secondary to dehydration    5. Need for prophylactic measure.     - Will start Lovenox 40mg Q24 SQD Pt is 89 y/o female w h/o HLD, UTI who was brought in by her two sons s/p syncope and admitted for further workup.    1. Syncope    -Admit to medicine resident service under Dr. Colunga to any monitored bed.    -Diet: regular diet    -Activity: ambulate only with assistance and/or walker.    - Orthostatic vitals.    -Trop, CBC, CMP done in E.D. UA shows trace LE. Pt asymptomatic.     -EKG done shows some t wave abnormality.    -CK elevated at 220.      - Aspirin 81mg.    - Cardiology Consult: Dr. Mccray ([facility]). Will need her outpt record since she was being worked up for syncope earlier on.    - Consider Neurology consult if cardio work up negative.    - PT consult    - Follow: AM labs, 2D echo (D/C if able to obtain [place] record), carotid duplex, MRI. Follow up trop times 2.    2. Hyperlipidemia     -CW home med simvastatin 40mg.    3. Elevated BUN     - Likely secondary to some dehydration. BUN/Creatine at baseline is WNl.     -Will give gentle hydration with plasmalyte at 80cc     -GFR of 50. Will repeat BMP in Am and reassess.     4. Traumatic rhabdomyolysis      -CK elevated at 220.     - Will give gentle hydration with plasmalyte at 80cc.      -Follow up repeat CK.    5. Elevated AST     -Likely secondary to dehydration     -Will give hydration and repeat lab    6. Need for prophylactic measure.     - Will start Lovenox 40mg Q24 SQD

## 2018-04-23 NOTE — ED ADULT NURSE NOTE - OBJECTIVE STATEMENT
89 year old female comes to ED c/o syncopal episode. pt. unaware of how she ended up on the floor. pt. denied getting dizzy, pt. denies chest pain, sob. son at bedside. pt. in no apparent distress at this time. 89 year old female comes to ED c/o syncopal episode after getting her lunch from the fridge. unknown if pt. hit her head. pt. unaware of how she ended up on the floor. patient. denied getting dizzy. as per son she called him and then by the time he got there his brother had her in the chair. pt. denies chest pain, sob. son at bedside. pt. in no apparent distress at this time.

## 2018-04-23 NOTE — H&P ADULT - NSHPSOCIALHISTORY_GEN_ALL_CORE
, Lives alone . Lives at home. Functional.   She does not drink, smoke or use illegal drugs.  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  Pt is DNR/DNI.   HCP is son Shahzad Valenzuela.

## 2018-04-23 NOTE — ED PROVIDER NOTE - OBJECTIVE STATEMENT
89 y/o F w/ PMHx of HLD presents to ED c/o presumed syncopal episode today. Pt states while putting a tray away today she suddenly found herself on the floor. She does not remember falling/slipping. Denies any pain, CP, SOB, dizziness, head injury or any complaints at this time. Cardiology: Dr. Juanito Mccray

## 2018-04-24 LAB
ANION GAP SERPL CALC-SCNC: 9 MMOL/L — SIGNIFICANT CHANGE UP (ref 5–17)
BASOPHILS # BLD AUTO: 0 K/UL — SIGNIFICANT CHANGE UP (ref 0–0.2)
BASOPHILS NFR BLD AUTO: 0.8 % — SIGNIFICANT CHANGE UP (ref 0–2)
BUN SERPL-MCNC: 17 MG/DL — SIGNIFICANT CHANGE UP (ref 8–20)
CALCIUM SERPL-MCNC: 8.7 MG/DL — SIGNIFICANT CHANGE UP (ref 8.6–10.2)
CHLORIDE SERPL-SCNC: 105 MMOL/L — SIGNIFICANT CHANGE UP (ref 98–107)
CO2 SERPL-SCNC: 28 MMOL/L — SIGNIFICANT CHANGE UP (ref 22–29)
CREAT SERPL-MCNC: 0.81 MG/DL — SIGNIFICANT CHANGE UP (ref 0.5–1.3)
EOSINOPHIL # BLD AUTO: 0.4 K/UL — SIGNIFICANT CHANGE UP (ref 0–0.5)
EOSINOPHIL NFR BLD AUTO: 9.8 % — HIGH (ref 0–6)
GLUCOSE SERPL-MCNC: 94 MG/DL — SIGNIFICANT CHANGE UP (ref 70–115)
HCT VFR BLD CALC: 39.7 % — SIGNIFICANT CHANGE UP (ref 37–47)
HGB BLD-MCNC: 12.2 G/DL — SIGNIFICANT CHANGE UP (ref 12–16)
LYMPHOCYTES # BLD AUTO: 0.9 K/UL — LOW (ref 1–4.8)
LYMPHOCYTES # BLD AUTO: 25.7 % — SIGNIFICANT CHANGE UP (ref 20–55)
MAGNESIUM SERPL-MCNC: 2.5 MG/DL — SIGNIFICANT CHANGE UP (ref 1.6–2.6)
MCHC RBC-ENTMCNC: 27.7 PG — SIGNIFICANT CHANGE UP (ref 27–31)
MCHC RBC-ENTMCNC: 30.7 G/DL — LOW (ref 32–36)
MCV RBC AUTO: 90 FL — SIGNIFICANT CHANGE UP (ref 81–99)
MONOCYTES # BLD AUTO: 0.4 K/UL — SIGNIFICANT CHANGE UP (ref 0–0.8)
MONOCYTES NFR BLD AUTO: 12.3 % — HIGH (ref 3–10)
NEUTROPHILS # BLD AUTO: 1.9 K/UL — SIGNIFICANT CHANGE UP (ref 1.8–8)
NEUTROPHILS NFR BLD AUTO: 51.4 % — SIGNIFICANT CHANGE UP (ref 37–73)
PHOSPHATE SERPL-MCNC: 2.8 MG/DL — SIGNIFICANT CHANGE UP (ref 2.4–4.7)
PLATELET # BLD AUTO: 204 K/UL — SIGNIFICANT CHANGE UP (ref 150–400)
POTASSIUM SERPL-MCNC: 4.6 MMOL/L — SIGNIFICANT CHANGE UP (ref 3.5–5.3)
POTASSIUM SERPL-SCNC: 4.6 MMOL/L — SIGNIFICANT CHANGE UP (ref 3.5–5.3)
RBC # BLD: 4.41 M/UL — SIGNIFICANT CHANGE UP (ref 4.4–5.2)
RBC # FLD: 13.8 % — SIGNIFICANT CHANGE UP (ref 11–15.6)
SODIUM SERPL-SCNC: 142 MMOL/L — SIGNIFICANT CHANGE UP (ref 135–145)
TROPONIN T SERPL-MCNC: <0.01 NG/ML — SIGNIFICANT CHANGE UP (ref 0–0.06)
TROPONIN T SERPL-MCNC: <0.01 NG/ML — SIGNIFICANT CHANGE UP (ref 0–0.06)
WBC # BLD: 3.7 K/UL — LOW (ref 4.8–10.8)
WBC # FLD AUTO: 3.7 K/UL — LOW (ref 4.8–10.8)

## 2018-04-24 PROCEDURE — 70551 MRI BRAIN STEM W/O DYE: CPT | Mod: 26

## 2018-04-24 PROCEDURE — 93880 EXTRACRANIAL BILAT STUDY: CPT | Mod: 26

## 2018-04-24 PROCEDURE — 93306 TTE W/DOPPLER COMPLETE: CPT | Mod: 26

## 2018-04-24 PROCEDURE — 99233 SBSQ HOSP IP/OBS HIGH 50: CPT | Mod: GC

## 2018-04-24 RX ORDER — SODIUM CHLORIDE 9 MG/ML
1000 INJECTION, SOLUTION INTRAVENOUS
Qty: 0 | Refills: 0 | Status: DISCONTINUED | OUTPATIENT
Start: 2018-04-24 | End: 2018-04-25

## 2018-04-24 RX ADMIN — SIMVASTATIN 40 MILLIGRAM(S): 20 TABLET, FILM COATED ORAL at 23:07

## 2018-04-24 RX ADMIN — ENOXAPARIN SODIUM 40 MILLIGRAM(S): 100 INJECTION SUBCUTANEOUS at 13:04

## 2018-04-24 RX ADMIN — SODIUM CHLORIDE 80 MILLILITER(S): 9 INJECTION, SOLUTION INTRAVENOUS at 04:13

## 2018-04-24 RX ADMIN — SODIUM CHLORIDE 80 MILLILITER(S): 9 INJECTION, SOLUTION INTRAVENOUS at 13:04

## 2018-04-24 RX ADMIN — Medication 81 MILLIGRAM(S): at 13:04

## 2018-04-24 NOTE — PROGRESS NOTE ADULT - SUBJECTIVE AND OBJECTIVE BOX
CC:Pt is 89 y/o female w h/o HLD, UTI who was brought in by her two sons s/p syncope and admitted for further workup.        Pt seen and examined at bedside. Pt denies any current complaints      Vital Signs   T(F): 98.6 (2018 07:46), Max: 98.6 (2018 07:46)  HR: 69 (2018 07:46) (67 - 86)  BP: 134/63 (2018 07:46) (124/71 - 157/78)  RR: 20 (2018 07:46) (18 - 20)  SpO2: 99% (2018 07:46) (97% - 100%)      General: Elderly female, laying in bed in NAD  HEENT: NC/AT, PERRLA, EOMI  Neck:  no JVD, no bruit  Lungs: CTA bilaterally, no rhonchi, no wheezes  Cardio: S1S2+, RRR, no murmurs  Abdominal: soft, NT, ND, BS+  Extremities: no edema, no calf tenderness, no ulcers in the feet  Neuro: AAOx3, CN 2-12 grossly intact.  sensation intact in all extremities, 5/5 strength                           12.5   7.7   )-----------( 208      ( 2018 16:28 )             39.9     2018 16:10    138    |  99     |  22.0   ----------------------------<  108    4.6     |  29.0   |  0.98     Ca    9.6        2018 16:10  Phos  2.8       2018 22:03  Mg     2.4       2018 22:03    TPro  7.8    /  Alb  4.1    /  TBili  0.3    /  DBili  x      /  AST  35     /  ALT  15     /  AlkPhos  85     2018 16:10    LIVER FUNCTIONS - ( 2018 16:10 )  Alb: 4.1 g/dL / Pro: 7.8 g/dL / ALK PHOS: 85 U/L / ALT: 15 U/L / AST: 35 U/L / GGT: x             CAPILLARY BLOOD GLUCOSE        CARDIAC MARKERS ( 2018 02:35 )  x     / <0.01 ng/mL / x     / x     / x      CARDIAC MARKERS ( 2018 22:03 )  x     / x     / 220 U/L / x     / 3.3 ng/mL  CARDIAC MARKERS ( 2018 16:10 )  x     / <0.01 ng/mL / x     / x     / x          Urinalysis Basic - ( 2018 20:57 )    Color: Yellow / Appearance: Clear / S.015 / pH: x  Gluc: x / Ketone: Negative  / Bili: Negative / Urobili: Negative mg/dL   Blood: x / Protein: Negative mg/dL / Nitrite: Negative   Leuk Esterase: Trace / RBC: Negative /HPF / WBC 0-2   Sq Epi: x / Non Sq Epi: Occasional / Bacteria: x        < from: Xray Chest 1 View- PORTABLE-Urgent (18 @ 18:53) >  IMPRESSION:   No evidence of active chest disease.            < from: CT Cervical Spine No Cont (18 @ 17:37) >    IMPRESSION: Mild degenerative spondylosis as described.  No fracture, dislocation or prevertebral soft tissue swelling of the   cervical spine.        < from: CT Head No Cont (18 @ 17:35) >  IMPRESSION:    No acute intracranial findings.    Moderate atrophy and chronic white matter microvascular ischemic changes   as described.   If acute stroke is of clinical concern, MRI with diffusion-weighted   images would be helpful for further characterization.        BEATRIZ BAZAN M.D., ATTENDING RADIOLOGIST  This document has been electronically signed. 2018  5:59PM CC:Pt is 89 y/o female w h/o HLD, UTI who was brought in by her two sons s/p syncope and admitted for further workup.    Pt seen and examined at bedside. Pt denies any current complaints. S/p all her testing today with no complaints.     ROS: No chest pain, palpitations, sob, light headedness/dizziness, difficulty breathing/cough, fevers/chills, abdominal pain, n/v, diarrhea/constipation, dysuria or increased urinary frequency. All other ROS negative       Vital Signs   T(F): 98.6 (2018 07:46), Max: 98.6 (2018 07:46)  HR: 69 (2018 07:46) (67 - 86)  BP: 134/63 (2018 07:46) (124/71 - 157/78)  RR: 20 (2018 07:46) (18 - 20)  SpO2: 99% (2018 07:46) (97% - 100%)      General: Elderly female, laying in bed in NAD  HEENT: NC/AT, PERRLA, EOMI  Neck:  no JVD, no bruit  Lungs: CTA bilaterally, no rhonchi, no wheezes  Cardio: S1S2+, RRR, no murmurs  Abdominal: soft, NT, ND, BS+  Extremities: no edema, no calf tenderness, no ulcers in the feet  Neuro: AAOx3, CN 2-12 grossly intact.  sensation intact in all extremities, 5/5 strength                           12.5   7.7   )-----------( 208      ( 2018 16:28 )             39.9     2018 16:10    138    |  99     |  22.0   ----------------------------<  108    4.6     |  29.0   |  0.98     Ca    9.6        2018 16:10  Phos  2.8       2018 22:03  Mg     2.4       2018 22:03    TPro  7.8    /  Alb  4.1    /  TBili  0.3    /  DBili  x      /  AST  35     /  ALT  15     /  AlkPhos  85     2018 16:10    LIVER FUNCTIONS - ( 2018 16:10 )  Alb: 4.1 g/dL / Pro: 7.8 g/dL / ALK PHOS: 85 U/L / ALT: 15 U/L / AST: 35 U/L / GGT: x             CAPILLARY BLOOD GLUCOSE        CARDIAC MARKERS ( 2018 02:35 )  x     / <0.01 ng/mL / x     / x     / x      CARDIAC MARKERS ( 2018 22:03 )  x     / x     / 220 U/L / x     / 3.3 ng/mL  CARDIAC MARKERS ( 2018 16:10 )  x     / <0.01 ng/mL / x     / x     / x          Urinalysis Basic - ( 2018 20:57 )    Color: Yellow / Appearance: Clear / S.015 / pH: x  Gluc: x / Ketone: Negative  / Bili: Negative / Urobili: Negative mg/dL   Blood: x / Protein: Negative mg/dL / Nitrite: Negative   Leuk Esterase: Trace / RBC: Negative /HPF / WBC 0-2   Sq Epi: x / Non Sq Epi: Occasional / Bacteria: x        < from: Xray Chest 1 View- PORTABLE-Urgent (18 @ 18:53) >  IMPRESSION:   No evidence of active chest disease.            < from: CT Cervical Spine No Cont (18 @ 17:37) >    IMPRESSION: Mild degenerative spondylosis as described.  No fracture, dislocation or prevertebral soft tissue swelling of the   cervical spine.        < from: CT Head No Cont (18 @ 17:35) >  IMPRESSION:    No acute intracranial findings.    Moderate atrophy and chronic white matter microvascular ischemic changes   as described.   If acute stroke is of clinical concern, MRI with diffusion-weighted   images would be helpful for further characterization.        BEATRIZ BAZAN M.D., ATTENDING RADIOLOGIST  This document has been electronically signed. 2018  5:59PM

## 2018-04-24 NOTE — CONSULT NOTE ADULT - SUBJECTIVE AND OBJECTIVE BOX
89F s/p fall and apparent LOC.  Does not remember why and how she fell.  Denies CP, SOB, palpitation.  Hx of SR incomplete RBBB, mild valvular insufficiency.    MEDICATIONS  (STANDING):  aspirin  chewable 81 milliGRAM(s) Oral daily  enoxaparin Injectable 40 milliGRAM(s) SubCutaneous every 24 hours  multiple electrolytes Injection Type 1 1000 milliLiter(s) (80 mL/Hr) IV Continuous <Continuous>  simvastatin 40 milliGRAM(s) Oral at bedtime    MEDICATIONS  (PRN):      FAMILY HISTORY:  No pertinent family history in first degree relatives      SOCIAL HISTORY:    CIGARETTES:  former smoker      REVIEW OF SYSTEMS:  CONSTITUTIONAL: No fever, weight loss, or fatigue  EYES: No eye pain, visual disturbances, or discharge  NECK: No pain or stiffness  RESPIRATORY: No cough, wheezing, chills or hemoptysis; No Shortness of Breath  CARDIOVASCULAR: No chest pain, palpitations, passing out, dizziness, or leg swelling  GASTROINTESTINAL: No abdominal or epigastric pain. No nausea, vomiting, or hematemesis  NEUROLOGICAL: No headaches, memory loss, loss of strength, numbness, or tremors  MUSCULOSKELETAL: No joint pain or swelling; No muscle, back, or extremity pain  PSYCHIATRIC: No depression, anxiety, mood swings, or difficulty sleeping	    Vital Signs Last 24 Hrs  T(C): 37 (2018 07:46), Max: 37 (2018 07:46)  T(F): 98.6 (2018 07:46), Max: 98.6 (2018 07:46)  HR: 69 (2018 07:46) (67 - 86)  BP: 134/63 (2018 07:46) (124/71 - 157/78)  BP(mean): --  RR: 20 (2018 07:46) (18 - 20)  SpO2: 99% (2018 07:46) (97% - 100%)    Daily Height in cm: 162.56 (2018 15:05)    Daily     I&O's Detail      PHYSICAL EXAM:  Appearance: Normal, well nourished		  Cardiovascular: Normal S1 S2, No JVD, No cardiac murmurs, No carotid bruits, No peripheral edema  Respiratory: Lungs clear to auscultation	  Gastrointestinal:  Soft, Non-tender, + BS, no bruits	  Skin: No rashes, No ecchymoses, No cyanosis  Neurologic: Grossly non-focal with full strength in all four extremities  Extremities: Normal range of motion, No clubbing, cyanosis or edema  Vascular: Peripheral pulses palpable 2+ bilaterally      INTERPRETATION OF TELEMETRY:    ECG: SR no acute st/t abn, IRBBB    LABS:                        12.5   7.7   )-----------( 208      ( 2018 16:28 )             39.9     -    138  |  99  |  22.0<H>  ----------------------------<  108  4.6   |  29.0  |  0.98    Ca    9.6      2018 16:10  Phos  2.8     -  Mg     2.4         TPro  7.8  /  Alb  4.1  /  TBili  0.3<L>  /  DBili  x   /  AST  35<H>  /  ALT  15  /  AlkPhos  85      CARDIAC MARKERS ( 2018 02:35 )  x     / <0.01 ng/mL / x     / x     / x      CARDIAC MARKERS ( 2018 22:03 )  x     / x     / 220 U/L / x     / 3.3 ng/mL  CARDIAC MARKERS ( 2018 16:10 )  x     / <0.01 ng/mL / x     / x     / x            Urinalysis Basic - ( 2018 20:57 )    Color: Yellow / Appearance: Clear / S.015 / pH: x  Gluc: x / Ketone: Negative  / Bili: Negative / Urobili: Negative mg/dL   Blood: x / Protein: Negative mg/dL / Nitrite: Negative   Leuk Esterase: Trace / RBC: Negative /HPF / WBC 0-2   Sq Epi: x / Non Sq Epi: Occasional / Bacteria: x      I&O's Summary    BNPSerum Pro-Brain Natriuretic Peptide: 154 pg/mL ( @ 20:13)    RADIOLOGY & ADDITIONAL STUDIES:

## 2018-04-24 NOTE — ED ADULT NURSE REASSESSMENT NOTE - NS ED NURSE REASSESS COMMENT FT1
pt sleeping and appears comfortable, resp even and unlabored no distress noted, cardiac monitor in place, will continue to monitor

## 2018-04-24 NOTE — PROGRESS NOTE ADULT - ASSESSMENT
Pt is 89 y/o female w h/o HLD, UTI who was brought in by her two sons s/p syncope and admitted for further workup.    1. Syncope    -Admit to medicine resident service under Dr. Colunga to any monitored bed.    -Diet: regular diet    -Activity: ambulate only with assistance and/or walker.    - Orthostatic vitals.    -Trop, CBC, CMP done in E.D. UA shows trace LE. Pt asymptomatic.     -EKG done shows some t wave abnormality.    -CK elevated at 220.      - Aspirin 81mg.    - Cardiology Consult: Dr. Mccray ([facility]). Will need her outpt record since she was being worked up for syncope earlier on.    - Consider Neurology consult if cardio work up negative.    - PT consult    - Follow: AM labs, 2D echo (D/C if able to obtain [place] record), carotid duplex, MRI. Follow up trop times 2.    2. Hyperlipidemia     -CW home med simvastatin 40mg.    3. Elevated BUN     - Likely secondary to some dehydration. BUN/Creatine at baseline is WNl.     -Will give gentle hydration with plasmalyte at 80cc     -GFR of 50. Will repeat BMP in Am and reassess.     4. Traumatic rhabdomyolysis      -CK elevated at 220.     - Will give gentle hydration with plasmalyte at 80cc.      -Follow up repeat CK.    5. Elevated AST     -Likely secondary to dehydration     -Will give hydration and repeat lab    6. Need for prophylactic measure.     - Will start Lovenox 40mg Q24 SQD Pt is 89 y/o female w h/o HLD, UTI who was brought in by her two sons s/p syncope and admitted for further workup.    1. Syncope r/o cva vs cardiac event     -Admit to medicine resident service under Dr. Colunga to any monitored bed.    -Diet: regular diet    -Activity: ambulate only with assistance and/or walker.    - Orthostatic vitals.    -Trop, CBC, CMP done in E.D. UA shows trace LE. Pt asymptomatic.     -EKG done shows some t wave abnormality.    -CK elevated at 220.      - Aspirin 81mg.    - Cardiology Consult: Dr. Mccray ([facility]). Will need her outpt record since she was being worked up for syncope earlier on.    - Consider Neurology consult if cardio work up negative.    - PT consult    - Follow: AM labs, 2D echo (D/C if able to obtain [place] record), carotid duplex, MRI. Follow up trop times 2.    2. Hyperlipidemia     -CW home med simvastatin 40mg.    3. Elevated BUN     - Likely secondary to some dehydration. BUN/Creatine at baseline is WNl.     -Will give gentle hydration with plasmalyte at 80cc     -GFR of 50. Will repeat BMP in Am and reassess.     4. Traumatic rhabdomyolysis      -CK elevated at 220.     - Will give gentle hydration with plasmalyte at 80cc.      -Follow up repeat CK.    5. Elevated AST     -Likely secondary to dehydration     -Will give hydration and repeat lab    6. Need for prophylactic measure.     - Will start Lovenox 40mg Q24 SQD    7. Disp0 - seen by pt, d/c to home in am

## 2018-04-24 NOTE — CONSULT NOTE ADULT - ASSESSMENT
Impression: 89F with LOC.  Etiology unclear at this time.  Agree with present therapy and evaluation.  Telemetry, Echo, carotid.  Likely out pt Holter and or event recorder.  Doubt ACS.

## 2018-04-24 NOTE — ED ADULT NURSE REASSESSMENT NOTE - NS ED NURSE REASSESS COMMENT FT1
pt A&Ox4, resp even and unlabored no distress noted, pt denies any pain and has no complaints at this time, cardiac monitor in place, report given to BRIDGETTE Ford in [facility]

## 2018-04-25 ENCOUNTER — TRANSCRIPTION ENCOUNTER (OUTPATIENT)
Age: 83
End: 2018-04-25

## 2018-04-25 VITALS — SYSTOLIC BLOOD PRESSURE: 118 MMHG | HEART RATE: 86 BPM | DIASTOLIC BLOOD PRESSURE: 58 MMHG

## 2018-04-25 LAB — CK SERPL-CCNC: 132 U/L — SIGNIFICANT CHANGE UP (ref 25–170)

## 2018-04-25 PROCEDURE — 99239 HOSP IP/OBS DSCHRG MGMT >30: CPT

## 2018-04-25 RX ADMIN — Medication 81 MILLIGRAM(S): at 12:59

## 2018-04-25 RX ADMIN — ENOXAPARIN SODIUM 40 MILLIGRAM(S): 100 INJECTION SUBCUTANEOUS at 12:59

## 2018-04-25 RX ADMIN — SODIUM CHLORIDE 80 MILLILITER(S): 9 INJECTION, SOLUTION INTRAVENOUS at 12:59

## 2018-04-25 NOTE — DISCHARGE NOTE ADULT - MEDICATION SUMMARY - MEDICATIONS TO TAKE
I will START or STAY ON the medications listed below when I get home from the hospital:    aspirin 81 mg oral tablet  -- 1 tab(s) by mouth once a day  -- Indication: For prophylaxis     acetaminophen 325 mg oral tablet  -- 2 tab(s) by mouth every 6 hours, As needed, For Temp greater than 38.5 C (101.3 F)  -- Indication: For pain or fever     simvastatin 40 mg oral tablet  -- 1 tab(s) by mouth once a day (at bedtime)  -- Indication: For hyperlipidemia    Ocuvite Eye + Multi oral tablet  -- 1 tab(s) by mouth once a day  -- Indication: For eye drops I will START or STAY ON the medications listed below when I get home from the hospital:    aspirin 81 mg oral tablet  -- 1 tab(s) by mouth once a day  -- Indication: For Prophylaxis    acetaminophen 325 mg oral tablet  -- 2 tab(s) by mouth every 6 hours, As needed, For Temp greater than 38.5 C (101.3 F)  -- Indication: For Fever    simvastatin 40 mg oral tablet  -- 1 tab(s) by mouth once a day (at bedtime)  -- Indication: For High cholesterol    Ocuvite Eye + Multi oral tablet  -- 1 tab(s) by mouth once a day  -- Indication: For Supplementation

## 2018-04-25 NOTE — DISCHARGE NOTE ADULT - PLAN OF CARE
encourage oral hydration -patient sent in for syncope and fall from chair  -no cardiac or neurological source found   -IVC collapsible, likely dehydration with elevated bun also   -given fluids and improved   -seen by PT and planned for d/c to home c/w statin

## 2018-04-25 NOTE — PHYSICAL THERAPY INITIAL EVALUATION ADULT - PERTINENT HX OF CURRENT PROBLEM, REHAB EVAL
pt presents to [facility] due to syncope event, rhabdomyolysis, r/o CVA,

## 2018-04-25 NOTE — PROGRESS NOTE ADULT - ATTENDING COMMENTS
Note addended where needed. Plan discussed with patient and daughter in law. Plan d/c to home. Will call Dr Sahni with plan of care (PCP) - awaiting for first name   Please see discharge papers for details.
Note addended where needed. Plan discussed with patient at bedside in addition to two sons at bedside and daughter in law who is a PT with us. Patients syncopal episode is likely due in part to dehydration with e/o collapsible IVC on echo. She is being hydrated. we will check orthostats in am. Mri/echo/US carotids negative. Not likely cardiac event either. d/w family, walks with walker, lives with brother who isn't always there and has an aide a few hours a week. Will need additional svcs. family agrees   likely d/c to home in am if remains stable   DNR/I

## 2018-04-25 NOTE — PHYSICAL THERAPY INITIAL EVALUATION ADULT - CRITERIA FOR SKILLED THERAPEUTIC INTERVENTIONS
impairments found/functional limitations in following categories/therapy frequency/predicted duration of therapy intervention/anticipated discharge recommendation/rehab potential

## 2018-04-25 NOTE — PROGRESS NOTE ADULT - SUBJECTIVE AND OBJECTIVE BOX
INTERVAL HISTORY: Denies CP. SOB, palpitation  	  MEDICATIONS:  simvastatin 40 milliGRAM(s) Oral at bedtime  aspirin  chewable 81 milliGRAM(s) Oral daily  enoxaparin Injectable 40 milliGRAM(s) SubCutaneous every 24 hours  multiple electrolytes Injection Type 1 1000 milliLiter(s) IV Continuous <Continuous>        PHYSICAL EXAM:  T(C): 36.6 (04-25-18 @ 06:30), Max: 36.6 (04-25-18 @ 06:30)  HR: 75 (04-25-18 @ 06:30) (63 - 82)  BP: 141/67 (04-25-18 @ 06:30) (120/75 - 141/67)  RR: 18 (04-25-18 @ 06:30) (18 - 23)  SpO2: 96% (04-25-18 @ 06:30) (96% - 98%)  Wt(kg): --  I&O's Summary    24 Apr 2018 07:01  -  25 Apr 2018 07:00  --------------------------------------------------------  IN: 880 mL / OUT: 0 mL / NET: 880 mL        Weight (kg): 58.5 (04-25 @ 02:00)    Appearance: Normal	  Cardiovascular: Normal S1 S2, No JVD, No murmurs, No edema  Respiratory: Lungs clear to auscultation	  Psychiatry: A & O x 3, Mood & affect appropriate  Gastrointestinal:  Soft, Non-tender, + BS	  Skin: No rashes, No ecchymoses, No cyanosis  Neurologic: Non-focal  Extremities: Normal range of motion, No clubbing, cyanosis or edema  Vascular: Peripheral pulses palpable 2+ bilaterally    TELEMETRY: SR	      LABS:	 	                       12.2   3.7   )-----------( 204      ( 24 Apr 2018 08:03 )             39.7     04-24    142  |  105  |  17.0  ----------------------------<  94  4.6   |  28.0  |  0.81    Ca    8.7      24 Apr 2018 08:03  Phos  2.8     04-24  Mg     2.5     04-24    TPro  7.8  /  Alb  4.1  /  TBili  0.3<L>  /  DBili  x   /  AST  35<H>  /  ALT  15  /  AlkPhos  85  04-23      ASSESSMENT/PLAN:     89F with LOC.  Etiology unclear at this time.  Agree with present therapy and evaluation.  Telemetry reveals SR w/o significant ectopy.   Echocardiogram pending, carotid study reveals bilateral 50-69% stenosis.  Likely out pt Holter and or event recorder.  Doubt ACS.

## 2018-04-25 NOTE — DISCHARGE NOTE ADULT - HOME CARE AGENCY
[facility] FOR RN AND HOME PT [phone] [facility] RN, [facility], HOME PT [phone]

## 2018-04-25 NOTE — PROGRESS NOTE ADULT - ASSESSMENT
Pt is 89 y/o female w h/o HLD, UTI who was brought in by her two sons s/p syncope and admitted for further workup.    1. Syncope r/o cva vs cardiac event which is unlikely.   -patient doing well and wants to go home.   -d/c to home today   -echo shows collapsed IVC, needs hydration, orthostats prior to d/c   -c/w aspirin   -seen by cardio   -seen by PT - plan home with RW which she has at home already     2. Hyperlipidemia     - home med simvastatin 40mg.    3. Elevated BUN-improved and likely 2/2 dehydration   -encourage oral intake     4. Traumatic rhabdomyolysis   -improved    5. Elevated AST     -Likely secondary to dehydration     -Will give hydration and repeat lab    6. Need for prophylactic measure.     - Will start Lovenox 40mg Q24 SQD    7. Disp0 - seen by pt, d/c to home in am today

## 2018-04-25 NOTE — DISCHARGE NOTE ADULT - PATIENT PORTAL LINK FT
You can access the [facility] Patient Portal, offered by [facility], by registering with the following website: [email or web address]

## 2018-04-25 NOTE — DISCHARGE NOTE ADULT - CARE PROVIDER_API CALL
Christian Sahni (MARIE), Medicine  [address]  [address]  [place] [address]  Phone: [phone]  Fax: [phone]    Juanito Mccray), Cardiovascular Disease; Internal Medicine  [address]  [address]  [place] [address]  Phone: [phone]  Fax: [phone]

## 2018-04-25 NOTE — PROGRESS NOTE ADULT - ASSESSMENT
Pt is 89 y/o female w h/o HLD, UTI who was brought in by her two sons s/p syncope and admitted for further workup.    1. Syncope r/o cva vs cardiac event     -Admit to medicine resident service under Dr. Colunga to any monitored bed.    -Diet: regular diet    -Activity: ambulate only with assistance and/or walker.    - Orthostatic vitals.    -Trop, CBC, CMP done in E.D. UA shows trace LE. Pt asymptomatic.     -EKG done shows some t wave abnormality.    -CK elevated at 220.      - Aspirin 81mg.    - Cardiology Consult: Dr. Mccray ([facility]). Will consider placing loop recorder as O/P    - Consider Neurology consult if cardio work up negative.    - PT consult    -: Carotid dopplers shows 50-69% bi-lateral internal carotid stenosis.    - MR: negative for any acute pathology    2. Hyperlipidemia     -CW home med simvastatin 40mg.    3. Elevated BUN     - Likely secondary to some dehydration. BUN/Creatine at baseline is WNl.     -Will give gentle hydration with plasmalyte at 80cc     -GFR of 50.     4 Elevated AST     -Likely secondary to dehydration     -Will give hydration     5 Need for prophylactic measure.     -  Lovenox 40mg     6. Disp- patient will be d/c today Pt is 89 y/o female w h/o HLD, UTI who was brought in by her two sons s/p syncope and admitted for further workup.    1. Syncope r/o cva vs cardiac event     -Admit to medicine resident service under Dr. Colunga to any monitored bed.    -Diet: regular diet    -Activity: ambulate only with assistance and/or walker.    - Orthostatic vitals- negative    -Trop, CBC, CMP done in E.D. UA shows trace LE. Pt asymptomatic.     -EKG done shows some t wave abnormality.    -CK elevated at 220.      - Aspirin 81mg.    - Cardiology Consult: Dr. Mccray ([facility]). Will consider placing loop recorder as O/P    - Consider Neurology consult if cardio work up negative.    - PT consult    -: Carotid dopplers shows 50-69% bi-lateral internal carotid stenosis.    - MR: negative for any acute pathology    2. Hyperlipidemia     -CW home med simvastatin 40mg.    3. Elevated BUN     - Likely secondary to some dehydration. BUN/Creatine at baseline is WNl.     -Will give gentle hydration with plasmalyte at 80cc     -GFR of 50.     4 Elevated AST     -Likely secondary to dehydration     -Will give hydration     5 Need for prophylactic measure.     -  Lovenox 40mg     6. Disp- patient will be d/c today

## 2018-04-25 NOTE — PHYSICAL THERAPY INITIAL EVALUATION ADULT - ADDITIONAL COMMENTS
owns and uses a SAC, uses a RW at times, 1 platform step to enter home plus 4 steps 2 rails to enter home, 8 steps 1 rail to bedroom

## 2018-04-25 NOTE — PROGRESS NOTE ADULT - SUBJECTIVE AND OBJECTIVE BOX
CC:Pt is 89 y/o female w h/o HLD, UTI who was brought in by her two sons s/p syncope and admitted for further workup.    Pt seen and examined at bedside. Pt denies any current complaints. S/p all her testing today with no complaints. Pt just complained about not being changed today by nursing staff    ROS: No chest pain, palpitations, sob, light headed ness/dizziness difficulty breathing/cough, fevers/chills, abdominal pain, n/v, diarrhea/constipation, dysuria or increased urinary frequency. All other ROS negative       ICU Vital   T(F): 98.3 (25 Apr 2018 08:50), Max: 98.3 (25 Apr 2018 08:50)  HR: 86 (25 Apr 2018 12:16) (63 - 86)  BP: 118/58 (25 Apr 2018 12:16) (118/58 - 141/67)  RR: 18 (25 Apr 2018 08:50) (18 - 23)  SpO2: 96% (25 Apr 2018 08:50) (96% - 98%)        General: Elderly female, laying in bed in NAD  HEENT: NC/AT, PERRLA, EOMI  Neck:  no JVD, no bruit  Lungs: CTA bilaterally, no rhonchi, no wheezes  Cardio: S1S2+, RRR, no murmurs  Abdominal: soft, NT, ND, BS+  Extremities: no edema, no calf tenderness, no ulcers in the feet  Neuro: AAOx3, CN 2-12 grossly intact.  sensation intact in all extremities, 5/5 strength            Labs: no new labs            < from: MR Head No Cont (04.24.18 @ 10:22) >    FINDINGS:  There is no abnormal restricted diffusion to suggest acute infarction.   Scattered periventricular and subcortical white matter T2 /FLAIR   hyperintensities are seenwithout mass effect, nonspecific, likely   representing mild chronic microvascular changes.  Normal T2 flow-voids are seen within  the intracranial vasculature. The   lateral ventricles and cortical sulci are age-appropriate in size and   configuration. There is no mass, mass effect, or extra-axial fluid   collection. There is no susceptibility artifact to suggest hemorrhage.   Midline structures are normal.  Mild to moderate polypoid inflammatory   mucosal changes are seen throughout the variousportions of the paranasal   sinuses. The orbits and mastoid air cells are unremarkable.     IMPRESSION: No acute infarction.                ANDRE LEYVA M.D., ATTENDING RADIOLOGIST    < end of copied text >    as described.   If acute stroke is of clinical concern, MRI with diffusion-weighted   images would be helpful for further characterization.  < from: TTE Echo Complete w/Doppler (04.24.18 @ 10:39) >    Summary:   1. The left atrium is normal in size.   2. Normal wall motion. Left ventricular ejection fraction, by visual   estimation, is 55 to 60%. Grade I diastolic dysfunction.   3. Mildly enlarged right atrium.   4. Normal right ventricular size and function.   5. Mild mitral valve regurgitation.   6. Mild aortic regurgitation.   7. There is no evidence of pericardial effusion.   8. Hyperdynamic LV with collapsible IVC suggestive of volume depletion.   Suggest clinical correlation.    [id number] Juliana Quintero MD, RPVI, Electronically signed on 4/24/2018 at   4:08:54 PM       < end of copied text > CC:Pt is 89 y/o female w h/o HLD, UTI who was brought in by her two sons s/p syncope and admitted for further workup.    Pt seen and examined at bedside. Pt denies any current complaints. Pt just complained about laying in bed all day and not having the ability to walk because her son took her walker home.     ROS: No chest pain, palpitations, sob, light headed ness/dizziness difficulty breathing/cough, fevers/chills, abdominal pain, n/v, diarrhea/constipation, dysuria or increased urinary frequency. All other ROS negative       ICU Vital   T(F): 98.3 (25 Apr 2018 08:50), Max: 98.3 (25 Apr 2018 08:50)  HR: 86 (25 Apr 2018 12:16) (63 - 86)  BP: 118/58 (25 Apr 2018 12:16) (118/58 - 141/67)  RR: 18 (25 Apr 2018 08:50) (18 - 23)  SpO2: 96% (25 Apr 2018 08:50) (96% - 98%)        General: Elderly female, laying in bed in NAD  HEENT: NC/AT, PERRLA, EOMI  Neck:  no JVD, no bruit  Lungs: CTA bilaterally, no rhonchi, no wheezes  Cardio: S1S2+, RRR, no murmurs  Abdominal: soft, NT, ND, BS+  Extremities: no edema, no calf tenderness, no ulcers in the feet  Neuro: AAOx3, CN 2-12 grossly intact.  sensation intact in all extremities, 5/5 strength            Labs: no new labs            < from: MR Head No Cont (04.24.18 @ 10:22) >    FINDINGS:  There is no abnormal restricted diffusion to suggest acute infarction.   Scattered periventricular and subcortical white matter T2 /FLAIR   hyperintensities are seenwithout mass effect, nonspecific, likely   representing mild chronic microvascular changes.  Normal T2 flow-voids are seen within  the intracranial vasculature. The   lateral ventricles and cortical sulci are age-appropriate in size and   configuration. There is no mass, mass effect, or extra-axial fluid   collection. There is no susceptibility artifact to suggest hemorrhage.   Midline structures are normal.  Mild to moderate polypoid inflammatory   mucosal changes are seen throughout the variousportions of the paranasal   sinuses. The orbits and mastoid air cells are unremarkable.     IMPRESSION: No acute infarction.    ANDRE LEYVA M.D., ATTENDING RADIOLOGIST        Summary:   1. The left atrium is normal in size.   2. Normal wall motion. Left ventricular ejection fraction, by visual   estimation, is 55 to 60%. Grade I diastolic dysfunction.   3. Mildly enlarged right atrium.   4. Normal right ventricular size and function.   5. Mild mitral valve regurgitation.   6. Mild aortic regurgitation.   7. There is no evidence of pericardial effusion.   8. Hyperdynamic LV with collapsible IVC suggestive of volume depletion.   Suggest clinical correlation.    [id number] Juliana Quintero MD, RPVI, Electronically signed on 4/24/2018 at   4:08:54 PM       < end of copied text >

## 2018-04-25 NOTE — PROGRESS NOTE ADULT - SUBJECTIVE AND OBJECTIVE BOX
CC:Pt is 89 y/o female w h/o HLD, UTI who was brought in by her two sons s/p syncope and admitted for further workup.    Pt seen and examined at bedside. Pt denies any current complaints. States that she is doing well and would like to go home. States that she walks well with some minimal help     ROS: No chest pain, palpitations, sob, light headedness/dizziness, difficulty breathing/cough, fevers/chills, abdominal pain, n/v, diarrhea/constipation, dysuria or increased urinary frequency. All other ROS negative     TELE: SR with some ectopy   Vital Signs Last 24 Hrs  T(C): 36.8 (2018 08:50), Max: 36.8 (2018 08:50)  T(F): 98.3 (2018 08:50), Max: 98.3 (2018 08:50)  HR: 86 (2018 12:16) (63 - 86)  BP: 118/58 (2018 12:16) (118/58 - 141/67)  BP(mean): --  RR: 18 (2018 08:50) (18 - 23)  SpO2: 96% (2018 08:50) (96% - 98%)    General: Elderly female, laying in bed in NAD  HEENT: NC/AT, PERRLA, EOMI,. Dry MM   Neck:  no JVD, no bruit  Lungs: CTA bilaterally, no rhonchi, no wheezes  Cardio: S1S2+, RRR, no murmurs  Abdominal: soft, NT, ND, BS+  Extremities: no edema, no calf tenderness, no ulcers in the feet  Neuro: AAOx3, CN 2-12 grossly intact.  sensation intact in all extremities, 5/5 strength              LABS:              12.5   7.7   )-----------( 208      ( 2018 16:28 )             39.9     2018 16:10    138    |  99     |  22.0   ----------------------------<  108    4.6     |  29.0   |  0.98     Ca    9.6        2018 16:10  Phos  2.8       2018 22:03  Mg     2.4       2018 22:03    TPro  7.8    /  Alb  4.1    /  TBili  0.3    /  DBili  x      /  AST  35     /  ALT  15     /  AlkPhos  85     2018 16:10    LIVER FUNCTIONS - ( 2018 16:10 )  Alb: 4.1 g/dL / Pro: 7.8 g/dL / ALK PHOS: 85 U/L / ALT: 15 U/L / AST: 35 U/L / GGT: x             CAPILLARY BLOOD GLUCOSE        CARDIAC MARKERS ( 2018 02:35 )  x     / <0.01 ng/mL / x     / x     / x      CARDIAC MARKERS ( 2018 22:03 )  x     / x     / 220 U/L / x     / 3.3 ng/mL  CARDIAC MARKERS ( 2018 16:10 )  x     / <0.01 ng/mL / x     / x     / x          Urinalysis Basic - ( 2018 20:57 )    Color: Yellow / Appearance: Clear / S.015 / pH: x  Gluc: x / Ketone: Negative  / Bili: Negative / Urobili: Negative mg/dL   Blood: x / Protein: Negative mg/dL / Nitrite: Negative   Leuk Esterase: Trace / RBC: Negative /HPF / WBC 0-2   Sq Epi: x / Non Sq Epi: Occasional / Bacteria: x        < from: Xray Chest 1 View- PORTABLE-Urgent (18 @ 18:53) >  IMPRESSION:   No evidence of active chest disease.            < from: CT Cervical Spine No Cont (18 @ 17:37) >    IMPRESSION: Mild degenerative spondylosis as described.  No fracture, dislocation or prevertebral soft tissue swelling of the   cervical spine.        < from: CT Head No Cont (18 @ 17:35) >  IMPRESSION:    No acute intracranial findings.    Moderate atrophy and chronic white matter microvascular ischemic changes   as described.   If acute stroke is of clinical concern, MRI with diffusion-weighted   images would be helpful for further characterization.        BEATRIZ BAZAN M.D., ATTENDING RADIOLOGIST  This document has been electronically signed. 2018  5:59PM

## 2018-04-25 NOTE — DISCHARGE NOTE ADULT - CARE PLAN
Principal Discharge DX:	Syncope  Goal:	encourage oral hydration  Assessment and plan of treatment:	-patient sent in for syncope and fall from chair  -no cardiac or neurological source found   -IVC collapsible, likely dehydration with elevated bun also   -given fluids and improved   -seen by PT and planned for d/c to home  Secondary Diagnosis:	High cholesterol  Assessment and plan of treatment:	c/w statin

## 2018-04-25 NOTE — DISCHARGE NOTE ADULT - HOSPITAL COURSE
Pt is 89 y/o female w h/o HLD, UTI who was brought in by her two sons s/p syncope and admitted for further workup. Patient had mri showing no stroke, echo showing collapsible IVC and is planned for d/c to home after some hydration. No further intervention. Patient had hydration, improved and is planned to go home with OP holter or event monitor. Patient also had elevated bun, lfts and resolution with hydration of elevated bun. Can f/up lfts as outpatient     See studies below. Planned for d/c to home. PCP called and message left to inform of plan of care    Total time coordinating this discharge was 40 minutes.

## 2018-04-25 NOTE — DISCHARGE NOTE ADULT - MEDICATION SUMMARY - MEDICATIONS TO STOP TAKING
I will STOP taking the medications listed below when I get home from the hospital:    lactobacillus acidophilus oral capsule  -- 1 cap(s) by mouth once a day for 10 days    Vantin  -- 200 milligram(s) by mouth once a day for 5 days

## 2018-04-25 NOTE — DISCHARGE NOTE ADULT - OTHER SIGNIFICANT FINDINGS
12.2   3.7   )-----------( 204      ( 24 Apr 2018 08:03 )             39.7   04-24    142  |  105  |  17.0  ----------------------------<  94  4.6   |  28.0  |  0.81    Ca    8.7      24 Apr 2018 08:03  Phos  2.8     04-24  Mg     2.5     04-24    TPro  7.8  /  Alb  4.1  /  TBili  0.3<L>  /  DBili  x   /  AST  35<H>  /  ALT  15  /  AlkPhos  85  04-23  < from: MR Head No Cont (04.24.18 @ 10:22) >  FINDINGS:  There is no abnormal restricted diffusion to suggest acute infarction.   Scattered periventricular and subcortical white matter T2 /FLAIR   hyperintensities are seenwithout mass effect, nonspecific, likely   representing mild chronic microvascular changes.  Normal T2 flow-voids are seen within  the intracranial vasculature. The   lateral ventricles and cortical sulci are age-appropriate in size and   configuration. There is no mass, mass effect, or extra-axial fluid   collection. There is no susceptibility artifact to suggest hemorrhage.   Midline structures are normal.  Mild to moderate polypoid inflammatory   mucosal changes are seen throughout the variousportions of the paranasal   sinuses. The orbits and mastoid air cells are unremarkable.     IMPRESSION: No acute infarction.    < end of copied text >    < from: US Duplex Carotid Arteries Complete, Bilateral (04.24.18 @ 09:03) >  There is noevidence of increased peak systolic velocities or flow   alterations to suggest stenosis in the above Flow in the vertebral   arteries was antegrade.    IMPRESSION: 50-69% stenosis in the bilateral mid internal carotid   arterie    < end of copied text >  < from: TTE Echo Complete w/Doppler (04.24.18 @ 10:39) >   1. The left atrium is normal in size.   2. Normal wall motion. Left ventricular ejection fraction, by visual   estimation, is 55 to 60%. Grade I diastolic dysfunction.   3. Mildly enlarged right atrium.   4. Normal right ventricular size and function.   5. Mild mitral valve regurgitation.   6. Mild aortic regurgitation.   7. There is no evidence of pericardial effusion.   8. Hyperdynamic LV with collapsible IVC suggestive of volume depletion.   Suggest clinical correlation.    < end of copied text >

## 2018-04-25 NOTE — DISCHARGE NOTE ADULT - ADDITIONAL INSTRUCTIONS
f/up with PCP Dr Sahni in a 3-4 days after discharge. I have spoken to the office to give summary of your stay.   F/up with Dr Mccray cardio in 1-2 weeks as outpatient for possible event or holter monitor

## 2018-05-02 ENCOUNTER — EMERGENCY (EMERGENCY)
Facility: HOSPITAL | Age: 83
LOS: 1 days | Discharge: HOME | End: 2018-05-02
Attending: EMERGENCY MEDICINE
Payer: MEDICARE

## 2018-05-02 VITALS
RESPIRATION RATE: 18 BRPM | DIASTOLIC BLOOD PRESSURE: 69 MMHG | OXYGEN SATURATION: 97 % | HEIGHT: 64 IN | WEIGHT: 123.9 LBS | HEART RATE: 98 BPM | SYSTOLIC BLOOD PRESSURE: 116 MMHG | TEMPERATURE: 98 F

## 2018-05-02 LAB
ALBUMIN SERPL ELPH-MCNC: 3.8 G/DL — SIGNIFICANT CHANGE UP (ref 3.3–5.2)
ALP SERPL-CCNC: 81 U/L — SIGNIFICANT CHANGE UP (ref 40–120)
ALT FLD-CCNC: 16 U/L — SIGNIFICANT CHANGE UP
ANION GAP SERPL CALC-SCNC: 14 MMOL/L — SIGNIFICANT CHANGE UP (ref 5–17)
APTT BLD: 35.8 SEC — SIGNIFICANT CHANGE UP (ref 27.5–37.4)
AST SERPL-CCNC: 29 U/L — SIGNIFICANT CHANGE UP
BILIRUB SERPL-MCNC: 0.3 MG/DL — LOW (ref 0.4–2)
BUN SERPL-MCNC: 15 MG/DL — SIGNIFICANT CHANGE UP (ref 8–20)
CALCIUM SERPL-MCNC: 10 MG/DL — SIGNIFICANT CHANGE UP (ref 8.6–10.2)
CHLORIDE SERPL-SCNC: 100 MMOL/L — SIGNIFICANT CHANGE UP (ref 98–107)
CO2 SERPL-SCNC: 27 MMOL/L — SIGNIFICANT CHANGE UP (ref 22–29)
CREAT SERPL-MCNC: 0.94 MG/DL — SIGNIFICANT CHANGE UP (ref 0.5–1.3)
GLUCOSE SERPL-MCNC: 157 MG/DL — HIGH (ref 70–115)
HCT VFR BLD CALC: 42.1 % — SIGNIFICANT CHANGE UP (ref 37–47)
HGB BLD-MCNC: 12.9 G/DL — SIGNIFICANT CHANGE UP (ref 12–16)
INR BLD: 0.96 RATIO — SIGNIFICANT CHANGE UP (ref 0.88–1.16)
MCHC RBC-ENTMCNC: 27.7 PG — SIGNIFICANT CHANGE UP (ref 27–31)
MCHC RBC-ENTMCNC: 30.6 G/DL — LOW (ref 32–36)
MCV RBC AUTO: 90.3 FL — SIGNIFICANT CHANGE UP (ref 81–99)
NT-PROBNP SERPL-SCNC: 302 PG/ML — HIGH (ref 0–300)
PLATELET # BLD AUTO: 240 K/UL — SIGNIFICANT CHANGE UP (ref 150–400)
POTASSIUM SERPL-MCNC: 4.8 MMOL/L — SIGNIFICANT CHANGE UP (ref 3.5–5.3)
POTASSIUM SERPL-SCNC: 4.8 MMOL/L — SIGNIFICANT CHANGE UP (ref 3.5–5.3)
PROT SERPL-MCNC: 7.1 G/DL — SIGNIFICANT CHANGE UP (ref 6.6–8.7)
PROTHROM AB SERPL-ACNC: 10.5 SEC — SIGNIFICANT CHANGE UP (ref 9.8–12.7)
RBC # BLD: 4.66 M/UL — SIGNIFICANT CHANGE UP (ref 4.4–5.2)
RBC # FLD: 14 % — SIGNIFICANT CHANGE UP (ref 11–15.6)
SODIUM SERPL-SCNC: 141 MMOL/L — SIGNIFICANT CHANGE UP (ref 135–145)
TROPONIN T SERPL-MCNC: 0.04 NG/ML — SIGNIFICANT CHANGE UP (ref 0–0.06)
WBC # BLD: 5.2 K/UL — SIGNIFICANT CHANGE UP (ref 4.8–10.8)
WBC # FLD AUTO: 5.2 K/UL — SIGNIFICANT CHANGE UP (ref 4.8–10.8)

## 2018-05-02 PROCEDURE — 83880 ASSAY OF NATRIURETIC PEPTIDE: CPT

## 2018-05-02 PROCEDURE — 85027 COMPLETE CBC AUTOMATED: CPT

## 2018-05-02 PROCEDURE — 85730 THROMBOPLASTIN TIME PARTIAL: CPT

## 2018-05-02 PROCEDURE — 99284 EMERGENCY DEPT VISIT MOD MDM: CPT | Mod: 25

## 2018-05-02 PROCEDURE — 71045 X-RAY EXAM CHEST 1 VIEW: CPT

## 2018-05-02 PROCEDURE — 99284 EMERGENCY DEPT VISIT MOD MDM: CPT

## 2018-05-02 PROCEDURE — 71045 X-RAY EXAM CHEST 1 VIEW: CPT | Mod: 26

## 2018-05-02 PROCEDURE — 85610 PROTHROMBIN TIME: CPT

## 2018-05-02 PROCEDURE — 84484 ASSAY OF TROPONIN QUANT: CPT

## 2018-05-02 PROCEDURE — 80053 COMPREHEN METABOLIC PANEL: CPT

## 2018-05-02 PROCEDURE — 36415 COLL VENOUS BLD VENIPUNCTURE: CPT

## 2018-05-02 PROCEDURE — 93005 ELECTROCARDIOGRAM TRACING: CPT

## 2018-05-02 PROCEDURE — 93010 ELECTROCARDIOGRAM REPORT: CPT

## 2018-05-02 RX ORDER — METOPROLOL TARTRATE 50 MG
1 TABLET ORAL
Qty: 60 | Refills: 0 | OUTPATIENT
Start: 2018-05-02 | End: 2018-05-31

## 2018-05-02 NOTE — ED PROVIDER NOTE - PROGRESS NOTE DETAILS
pt asymptomatic at this time; presently in nsr; pt to have serial cardiac enzymes and if negative to be discharged with family to see Dr Handy in the office

## 2018-05-02 NOTE — ED ADULT TRIAGE NOTE - CHIEF COMPLAINT QUOTE
Pt BIBA A&Ox3, sent from MD's office for new onset Afib. Pt denies any CP or SOB, respirations even and unlabored. Pt voices no complaints.

## 2018-05-02 NOTE — ED ADULT NURSE NOTE - OBJECTIVE STATEMENT
PT BIBA  for new onset of Afib from Dr Lim's office. Pt states she was having a follow up visit with PMD and states she had no symptoms, denies chest pain, SOB, CASANOVA. Pt walks with a cane. Pt A & OX4, respirations even & unlabored. Pt evaluated by MD waiting for  lab results.

## 2018-05-02 NOTE — ED ADULT NURSE REASSESSMENT NOTE - NS ED NURSE REASSESS COMMENT FT1
Dr Villegas will see patient in the office today at [facility] after pt is discharged from ED. Pt is to arrive prior to 14:30, Dr Villegas only in office until 14:45, Dr Rodarte and pt's son made aware.

## 2018-05-02 NOTE — ED ADULT NURSE NOTE - ED CARDIAC RHYTHM
----- Message from Bret Carson MD sent at 7/5/2017 11:35 PM EDT -----  Regarding: Wei Portillo;    Let her know we should get lexiscan NST for new cardiomyopathy    thx    b regular

## 2018-05-02 NOTE — ED PROVIDER NOTE - OBJECTIVE STATEMENT
89 yo female [facility] high cholesterol comes to ed with palpitations; pt seen at pmd office with ekg noted with atrial fibrillation with rvr; pt sent to ed for further evaluaton

## 2018-05-23 PROCEDURE — 81001 URINALYSIS AUTO W/SCOPE: CPT

## 2018-05-23 PROCEDURE — 80053 COMPREHEN METABOLIC PANEL: CPT

## 2018-05-23 PROCEDURE — 70450 CT HEAD/BRAIN W/O DYE: CPT

## 2018-05-23 PROCEDURE — 83880 ASSAY OF NATRIURETIC PEPTIDE: CPT

## 2018-05-23 PROCEDURE — 82550 ASSAY OF CK (CPK): CPT

## 2018-05-23 PROCEDURE — 82553 CREATINE MB FRACTION: CPT

## 2018-05-23 PROCEDURE — 71045 X-RAY EXAM CHEST 1 VIEW: CPT

## 2018-05-23 PROCEDURE — 93306 TTE W/DOPPLER COMPLETE: CPT

## 2018-05-23 PROCEDURE — 93005 ELECTROCARDIOGRAM TRACING: CPT

## 2018-05-23 PROCEDURE — 85027 COMPLETE CBC AUTOMATED: CPT

## 2018-05-23 PROCEDURE — 80048 BASIC METABOLIC PNL TOTAL CA: CPT

## 2018-05-23 PROCEDURE — 84100 ASSAY OF PHOSPHORUS: CPT

## 2018-05-23 PROCEDURE — 99285 EMERGENCY DEPT VISIT HI MDM: CPT

## 2018-05-23 PROCEDURE — 97163 PT EVAL HIGH COMPLEX 45 MIN: CPT

## 2018-05-23 PROCEDURE — 97110 THERAPEUTIC EXERCISES: CPT

## 2018-05-23 PROCEDURE — 72125 CT NECK SPINE W/O DYE: CPT

## 2018-05-23 PROCEDURE — 83735 ASSAY OF MAGNESIUM: CPT

## 2018-05-23 PROCEDURE — 97116 GAIT TRAINING THERAPY: CPT

## 2018-05-23 PROCEDURE — 70551 MRI BRAIN STEM W/O DYE: CPT

## 2018-05-23 PROCEDURE — 93880 EXTRACRANIAL BILAT STUDY: CPT

## 2018-05-23 PROCEDURE — 36415 COLL VENOUS BLD VENIPUNCTURE: CPT

## 2018-05-23 PROCEDURE — 84484 ASSAY OF TROPONIN QUANT: CPT

## 2018-09-04 ENCOUNTER — INPATIENT (INPATIENT)
Facility: HOSPITAL | Age: 83
LOS: 6 days | Discharge: HOME | DRG: 871 | End: 2018-09-11
Attending: INTERNAL MEDICINE | Admitting: HOSPITALIST
Payer: MEDICARE

## 2018-09-04 VITALS
SYSTOLIC BLOOD PRESSURE: 138 MMHG | RESPIRATION RATE: 18 BRPM | HEART RATE: 113 BPM | DIASTOLIC BLOOD PRESSURE: 83 MMHG | TEMPERATURE: 100 F | OXYGEN SATURATION: 97 %

## 2018-09-04 DIAGNOSIS — R41.82 ALTERED MENTAL STATUS, UNSPECIFIED: ICD-10-CM

## 2018-09-04 LAB
ALBUMIN SERPL ELPH-MCNC: 4.5 G/DL — SIGNIFICANT CHANGE UP (ref 3.3–5.2)
ALP SERPL-CCNC: 89 U/L — SIGNIFICANT CHANGE UP (ref 40–120)
ALT FLD-CCNC: 12 U/L — SIGNIFICANT CHANGE UP
AMMONIA BLD-MCNC: 25 UMOL/L — SIGNIFICANT CHANGE UP (ref 11–55)
ANION GAP SERPL CALC-SCNC: 18 MMOL/L — HIGH (ref 5–17)
APPEARANCE UR: CLEAR — SIGNIFICANT CHANGE UP
APTT BLD: 34.3 SEC — SIGNIFICANT CHANGE UP (ref 27.5–37.4)
AST SERPL-CCNC: 35 U/L — HIGH
BACTERIA # UR AUTO: ABNORMAL
BASE EXCESS BLDA CALC-SCNC: -1.1 MMOL/L — SIGNIFICANT CHANGE UP (ref -2–2)
BASOPHILS # BLD AUTO: 0 K/UL — SIGNIFICANT CHANGE UP (ref 0–0.2)
BASOPHILS NFR BLD AUTO: 0.1 % — SIGNIFICANT CHANGE UP (ref 0–2)
BILIRUB SERPL-MCNC: 0.9 MG/DL — SIGNIFICANT CHANGE UP (ref 0.4–2)
BILIRUB UR-MCNC: NEGATIVE — SIGNIFICANT CHANGE UP
BLOOD GAS COMMENTS ARTERIAL: SIGNIFICANT CHANGE UP
BUN SERPL-MCNC: 14 MG/DL — SIGNIFICANT CHANGE UP (ref 8–20)
CALCIUM SERPL-MCNC: 9.6 MG/DL — SIGNIFICANT CHANGE UP (ref 8.6–10.2)
CHLORIDE SERPL-SCNC: 91 MMOL/L — LOW (ref 98–107)
CO2 SERPL-SCNC: 24 MMOL/L — SIGNIFICANT CHANGE UP (ref 22–29)
COLOR SPEC: YELLOW — SIGNIFICANT CHANGE UP
CREAT SERPL-MCNC: 0.77 MG/DL — SIGNIFICANT CHANGE UP (ref 0.5–1.3)
DIFF PNL FLD: ABNORMAL
EOSINOPHIL # BLD AUTO: 0 K/UL — SIGNIFICANT CHANGE UP (ref 0–0.5)
EOSINOPHIL NFR BLD AUTO: 0 % — SIGNIFICANT CHANGE UP (ref 0–6)
EPI CELLS # UR: SIGNIFICANT CHANGE UP
GAS PNL BLDA: SIGNIFICANT CHANGE UP
GLUCOSE SERPL-MCNC: 143 MG/DL — HIGH (ref 70–115)
GLUCOSE UR QL: NEGATIVE MG/DL — SIGNIFICANT CHANGE UP
HCO3 BLDA-SCNC: 24 MMOL/L — SIGNIFICANT CHANGE UP (ref 20–26)
HCT VFR BLD CALC: 45.9 % — SIGNIFICANT CHANGE UP (ref 37–47)
HGB BLD-MCNC: 14.6 G/DL — SIGNIFICANT CHANGE UP (ref 12–16)
HOROWITZ INDEX BLDA+IHG-RTO: 0.21 — SIGNIFICANT CHANGE UP
INR BLD: 0.89 RATIO — SIGNIFICANT CHANGE UP (ref 0.88–1.16)
KETONES UR-MCNC: NEGATIVE — SIGNIFICANT CHANGE UP
LACTATE BLDV-MCNC: 2.4 MMOL/L — HIGH (ref 0.5–2)
LEUKOCYTE ESTERASE UR-ACNC: NEGATIVE — SIGNIFICANT CHANGE UP
LYMPHOCYTES # BLD AUTO: 0.8 K/UL — LOW (ref 1–4.8)
LYMPHOCYTES # BLD AUTO: 10.9 % — LOW (ref 20–55)
MCHC RBC-ENTMCNC: 28.5 PG — SIGNIFICANT CHANGE UP (ref 27–31)
MCHC RBC-ENTMCNC: 31.8 G/DL — LOW (ref 32–36)
MCV RBC AUTO: 89.5 FL — SIGNIFICANT CHANGE UP (ref 81–99)
MONOCYTES # BLD AUTO: 0.6 K/UL — SIGNIFICANT CHANGE UP (ref 0–0.8)
MONOCYTES NFR BLD AUTO: 8 % — SIGNIFICANT CHANGE UP (ref 3–10)
NEUTROPHILS # BLD AUTO: 6.1 K/UL — SIGNIFICANT CHANGE UP (ref 1.8–8)
NEUTROPHILS NFR BLD AUTO: 80.7 % — HIGH (ref 37–73)
NITRITE UR-MCNC: NEGATIVE — SIGNIFICANT CHANGE UP
PCO2 BLDA: 30 MMHG — LOW (ref 35–45)
PH BLDA: 7.47 — HIGH (ref 7.35–7.45)
PH UR: 8 — SIGNIFICANT CHANGE UP (ref 5–8)
PLATELET # BLD AUTO: 207 K/UL — SIGNIFICANT CHANGE UP (ref 150–400)
PO2 BLDA: 70 MMHG — LOW (ref 83–108)
POTASSIUM SERPL-MCNC: 4 MMOL/L — SIGNIFICANT CHANGE UP (ref 3.5–5.3)
POTASSIUM SERPL-SCNC: 4 MMOL/L — SIGNIFICANT CHANGE UP (ref 3.5–5.3)
PROT SERPL-MCNC: 8.7 G/DL — SIGNIFICANT CHANGE UP (ref 6.6–8.7)
PROT UR-MCNC: 100 MG/DL
PROTHROM AB SERPL-ACNC: 9.8 SEC — SIGNIFICANT CHANGE UP (ref 9.8–12.7)
RBC # BLD: 5.13 M/UL — SIGNIFICANT CHANGE UP (ref 4.4–5.2)
RBC # FLD: 13.7 % — SIGNIFICANT CHANGE UP (ref 11–15.6)
RBC CASTS # UR COMP ASSIST: ABNORMAL /HPF (ref 0–4)
SAO2 % BLDA: 96 % — SIGNIFICANT CHANGE UP (ref 95–99)
SODIUM SERPL-SCNC: 133 MMOL/L — LOW (ref 135–145)
SP GR SPEC: 1.01 — SIGNIFICANT CHANGE UP (ref 1.01–1.02)
UROBILINOGEN FLD QL: NEGATIVE MG/DL — SIGNIFICANT CHANGE UP
WBC # BLD: 7.6 K/UL — SIGNIFICANT CHANGE UP (ref 4.8–10.8)
WBC # FLD AUTO: 7.6 K/UL — SIGNIFICANT CHANGE UP (ref 4.8–10.8)
WBC UR QL: SIGNIFICANT CHANGE UP

## 2018-09-04 PROCEDURE — 71045 X-RAY EXAM CHEST 1 VIEW: CPT | Mod: 26

## 2018-09-04 PROCEDURE — 99285 EMERGENCY DEPT VISIT HI MDM: CPT

## 2018-09-04 PROCEDURE — 70450 CT HEAD/BRAIN W/O DYE: CPT | Mod: 26

## 2018-09-04 RX ORDER — METOPROLOL TARTRATE 50 MG
5 TABLET ORAL ONCE
Qty: 0 | Refills: 0 | Status: COMPLETED | OUTPATIENT
Start: 2018-09-04 | End: 2018-09-04

## 2018-09-04 RX ORDER — CEFTRIAXONE 500 MG/1
1 INJECTION, POWDER, FOR SOLUTION INTRAMUSCULAR; INTRAVENOUS ONCE
Qty: 0 | Refills: 0 | Status: COMPLETED | OUTPATIENT
Start: 2018-09-04 | End: 2018-09-04

## 2018-09-04 RX ORDER — METOPROLOL TARTRATE 50 MG
5 TABLET ORAL EVERY 6 HOURS
Qty: 0 | Refills: 0 | Status: DISCONTINUED | OUTPATIENT
Start: 2018-09-04 | End: 2018-09-04

## 2018-09-04 RX ORDER — VANCOMYCIN HCL 1 G
1000 VIAL (EA) INTRAVENOUS ONCE
Qty: 0 | Refills: 0 | Status: COMPLETED | OUTPATIENT
Start: 2018-09-04 | End: 2018-09-04

## 2018-09-04 RX ORDER — ACETAMINOPHEN 500 MG
650 TABLET ORAL ONCE
Qty: 0 | Refills: 0 | Status: COMPLETED | OUTPATIENT
Start: 2018-09-04 | End: 2018-09-04

## 2018-09-04 RX ORDER — SODIUM CHLORIDE 9 MG/ML
2000 INJECTION INTRAMUSCULAR; INTRAVENOUS; SUBCUTANEOUS ONCE
Qty: 0 | Refills: 0 | Status: COMPLETED | OUTPATIENT
Start: 2018-09-04 | End: 2018-09-04

## 2018-09-04 RX ADMIN — SODIUM CHLORIDE 2000 MILLILITER(S): 9 INJECTION INTRAMUSCULAR; INTRAVENOUS; SUBCUTANEOUS at 20:45

## 2018-09-04 RX ADMIN — SODIUM CHLORIDE 2000 MILLILITER(S): 9 INJECTION INTRAMUSCULAR; INTRAVENOUS; SUBCUTANEOUS at 17:45

## 2018-09-04 RX ADMIN — CEFTRIAXONE 100 GRAM(S): 500 INJECTION, POWDER, FOR SOLUTION INTRAMUSCULAR; INTRAVENOUS at 17:46

## 2018-09-04 RX ADMIN — Medication 650 MILLIGRAM(S): at 22:46

## 2018-09-04 RX ADMIN — CEFTRIAXONE 1 GRAM(S): 500 INJECTION, POWDER, FOR SOLUTION INTRAMUSCULAR; INTRAVENOUS at 18:15

## 2018-09-04 RX ADMIN — Medication 5 MILLIGRAM(S): at 20:59

## 2018-09-04 RX ADMIN — Medication 650 MILLIGRAM(S): at 20:59

## 2018-09-04 RX ADMIN — Medication 250 MILLIGRAM(S): at 22:59

## 2018-09-04 NOTE — ED ADULT NURSE NOTE - NSIMPLEMENTINTERV_GEN_ALL_ED
Implemented All Fall with Harm Risk Interventions:  [place] to call system. Call bell, personal items and telephone within reach. Instruct patient to call for assistance. Room bathroom lighting operational. Non-slip footwear when patient is off stretcher. Physically safe environment: no spills, clutter or unnecessary equipment. Stretcher in lowest position, wheels locked, appropriate side rails in place. Provide visual cue, wrist band, yellow gown, etc. Monitor gait and stability. Monitor for mental status changes and reorient to person, place, and time. Review medications for side effects contributing to fall risk. Reinforce activity limits and safety measures with patient and family. Provide visual clues: red socks.

## 2018-09-04 NOTE — H&P ADULT - PROBLEM SELECTOR PLAN 1
Admit patient to medical floor   AMS likely due to dehydration Vs UTI  Patient received vancomycin and Rocephin in ED  Will continue Rocephin 2g daily for now  Blood culture and urine culture sent follow up result  Tylenol 650mg q6h PRN for fever  Gentle hydration with NS @ 83ml/hr for 12 hours and then reassess  Aspirin 81mg  DVT: heparin 5000unit q8h  GI prophylaxis: not indicated  Ocuvite Eye + Multi oral tablet

## 2018-09-04 NOTE — ED PROVIDER NOTE - OBJECTIVE STATEMENT
A 89 year old female pt reported to be confused by family x 1 day with fever, no cough, no N/V/D. Pt had similar episode in the past with UTI. Pt is non-verbal secondary to dementia.

## 2018-09-04 NOTE — H&P ADULT - PROBLEM SELECTOR PLAN 3
Patient has a loop recorder; as per patient's son, no abnormality has been reported.   Likely due to underlying infection   Rate is controlled   Metoprolol given in ED   Will continue metoprolol tartrate 25mg bid

## 2018-09-04 NOTE — H&P ADULT - FAMILY HISTORY
No pertinent family history in first degree relatives No pertinent family history in first degree relatives, of HLD

## 2018-09-04 NOTE — H&P ADULT - ASSESSMENT
89 y/o female with PMHx of HLD and UTI was brought to the ED due to AMS x 1 day.                 2. Hyperlipidemia     -CW home med simvastatin 40mg.

## 2018-09-04 NOTE — ED PROVIDER NOTE - CARE PLAN
Principal Discharge DX:	Altered mental status, unspecified altered mental status type  Secondary Diagnosis:	Febrile illness

## 2018-09-04 NOTE — ED ADULT NURSE NOTE - CHPI ED NUR SYMPTOMS NEG
no fever/no abdominal pain/no rash/no diarrhea/no decreased eating/drinking/no vomiting/no chills/no headache/no cough/no shortness of breath

## 2018-09-04 NOTE — ED ADULT NURSE NOTE - CHIEF COMPLAINT QUOTE
Hx of dementia.  Last seen normal last night. Son found patient in bed and was unable to wake her up.  Pt arrives febrile and responsive to pain. Code sepsis called.

## 2018-09-04 NOTE — H&P ADULT - HISTORY OF PRESENT ILLNESS
89 y/o female with PMHx of HLD and UTI was brought to the ED due to AMS x 1 day. History gotten from patient's son who stated that patient was found confused in the house and also has decrease oral intake. He also noted associated fever; as per son, patient has no recent travel, no sick contact, no cough, N/V, change in bowel or urinary habit. Baseline, patient is demented but still able to walk around the house, groom herself and engage in conversation with family.

## 2018-09-04 NOTE — ED ADULT NURSE REASSESSMENT NOTE - NS ED NURSE REASSESS COMMENT FT1
pt continues to be responsive only to painful stimuli, mostly nonverbal. breathing even and unlabored. nsr to afib on cm. will continue to monitor.

## 2018-09-04 NOTE — ED ADULT NURSE NOTE - OBJECTIVE STATEMENT
CODE SEPSIS called. 89 year old female comes from Nursing home, last seen at her baseline mental status x1 day a go, with a baseline mental status of dementia. CODE SEPSIS called. 89 year old female comes from home, last seen at her baseline mental status x1 day a go, with a baseline mental status of dementia but is normally a&ox3. as per sons. as per sons they saw her yesterday at 1630pm. aide at home 1-2x a week 2 hours a day, but is normally up ambulating without assistance. CODE SEPSIS called. 89 year old female comes from home, last seen at her baseline mental status x1 day a go, with a baseline mental status of dementia but is normally a&ox3 with periods of forgetfulness. as per sons, they saw her yesterday at 1630pm. aide at home 1-2x a week 2 hours a day, but is normally up ambulating without assistance. pt arrives confused, mostly nonverbal with incoherent speech, mumbles and opens eyes to painful stimuli.  breathing even and unlabored. afib on cm. will continue to monitor.

## 2018-09-05 DIAGNOSIS — I48.91 UNSPECIFIED ATRIAL FIBRILLATION: ICD-10-CM

## 2018-09-05 DIAGNOSIS — R41.82 ALTERED MENTAL STATUS, UNSPECIFIED: ICD-10-CM

## 2018-09-05 DIAGNOSIS — E78.00 PURE HYPERCHOLESTEROLEMIA, UNSPECIFIED: ICD-10-CM

## 2018-09-05 LAB
ANION GAP SERPL CALC-SCNC: 14 MMOL/L — SIGNIFICANT CHANGE UP (ref 5–17)
BUN SERPL-MCNC: 12 MG/DL — SIGNIFICANT CHANGE UP (ref 8–20)
CALCIUM SERPL-MCNC: 8.4 MG/DL — LOW (ref 8.6–10.2)
CHLORIDE SERPL-SCNC: 100 MMOL/L — SIGNIFICANT CHANGE UP (ref 98–107)
CO2 SERPL-SCNC: 21 MMOL/L — LOW (ref 22–29)
CREAT SERPL-MCNC: 0.53 MG/DL — SIGNIFICANT CHANGE UP (ref 0.5–1.3)
CULTURE RESULTS: NO GROWTH — SIGNIFICANT CHANGE UP
GLUCOSE SERPL-MCNC: 105 MG/DL — SIGNIFICANT CHANGE UP (ref 70–115)
HCT VFR BLD CALC: 44.9 % — SIGNIFICANT CHANGE UP (ref 37–47)
HGB BLD-MCNC: 13.8 G/DL — SIGNIFICANT CHANGE UP (ref 12–16)
MCHC RBC-ENTMCNC: 27.9 PG — SIGNIFICANT CHANGE UP (ref 27–31)
MCHC RBC-ENTMCNC: 30.7 G/DL — LOW (ref 32–36)
MCV RBC AUTO: 90.9 FL — SIGNIFICANT CHANGE UP (ref 81–99)
PLATELET # BLD AUTO: 139 K/UL — LOW (ref 150–400)
POTASSIUM SERPL-MCNC: 3.4 MMOL/L — LOW (ref 3.5–5.3)
POTASSIUM SERPL-SCNC: 3.4 MMOL/L — LOW (ref 3.5–5.3)
RBC # BLD: 4.94 M/UL — SIGNIFICANT CHANGE UP (ref 4.4–5.2)
RBC # FLD: 13.8 % — SIGNIFICANT CHANGE UP (ref 11–15.6)
SODIUM SERPL-SCNC: 135 MMOL/L — SIGNIFICANT CHANGE UP (ref 135–145)
SPECIMEN SOURCE: SIGNIFICANT CHANGE UP
WBC # BLD: 10 K/UL — SIGNIFICANT CHANGE UP (ref 4.8–10.8)
WBC # FLD AUTO: 10 K/UL — SIGNIFICANT CHANGE UP (ref 4.8–10.8)

## 2018-09-05 PROCEDURE — 99233 SBSQ HOSP IP/OBS HIGH 50: CPT

## 2018-09-05 PROCEDURE — 93010 ELECTROCARDIOGRAM REPORT: CPT

## 2018-09-05 RX ORDER — ASPIRIN/CALCIUM CARB/MAGNESIUM 324 MG
81 TABLET ORAL DAILY
Qty: 0 | Refills: 0 | Status: DISCONTINUED | OUTPATIENT
Start: 2018-09-05 | End: 2018-09-11

## 2018-09-05 RX ORDER — ACETAMINOPHEN 500 MG
650 TABLET ORAL EVERY 6 HOURS
Qty: 0 | Refills: 0 | Status: DISCONTINUED | OUTPATIENT
Start: 2018-09-05 | End: 2018-09-11

## 2018-09-05 RX ORDER — HEPARIN SODIUM 5000 [USP'U]/ML
5000 INJECTION INTRAVENOUS; SUBCUTANEOUS EVERY 8 HOURS
Qty: 0 | Refills: 0 | Status: DISCONTINUED | OUTPATIENT
Start: 2018-09-05 | End: 2018-09-05

## 2018-09-05 RX ORDER — HEPARIN SODIUM 5000 [USP'U]/ML
5000 INJECTION INTRAVENOUS; SUBCUTANEOUS EVERY 12 HOURS
Qty: 0 | Refills: 0 | Status: DISCONTINUED | OUTPATIENT
Start: 2018-09-05 | End: 2018-09-11

## 2018-09-05 RX ORDER — SIMVASTATIN 20 MG/1
40 TABLET, FILM COATED ORAL AT BEDTIME
Qty: 0 | Refills: 0 | Status: DISCONTINUED | OUTPATIENT
Start: 2018-09-05 | End: 2018-09-11

## 2018-09-05 RX ORDER — CEFTRIAXONE 500 MG/1
2 INJECTION, POWDER, FOR SOLUTION INTRAMUSCULAR; INTRAVENOUS EVERY 24 HOURS
Qty: 0 | Refills: 0 | Status: DISCONTINUED | OUTPATIENT
Start: 2018-09-05 | End: 2018-09-05

## 2018-09-05 RX ORDER — SODIUM CHLORIDE 9 MG/ML
1000 INJECTION INTRAMUSCULAR; INTRAVENOUS; SUBCUTANEOUS
Qty: 0 | Refills: 0 | Status: DISCONTINUED | OUTPATIENT
Start: 2018-09-05 | End: 2018-09-07

## 2018-09-05 RX ORDER — CEFTRIAXONE 500 MG/1
1 INJECTION, POWDER, FOR SOLUTION INTRAMUSCULAR; INTRAVENOUS EVERY 24 HOURS
Qty: 0 | Refills: 0 | Status: DISCONTINUED | OUTPATIENT
Start: 2018-09-05 | End: 2018-09-07

## 2018-09-05 RX ORDER — METOPROLOL TARTRATE 50 MG
25 TABLET ORAL EVERY 12 HOURS
Qty: 0 | Refills: 0 | Status: DISCONTINUED | OUTPATIENT
Start: 2018-09-05 | End: 2018-09-06

## 2018-09-05 RX ORDER — SODIUM CHLORIDE 9 MG/ML
1000 INJECTION INTRAMUSCULAR; INTRAVENOUS; SUBCUTANEOUS
Qty: 0 | Refills: 0 | Status: DISCONTINUED | OUTPATIENT
Start: 2018-09-05 | End: 2018-09-05

## 2018-09-05 RX ADMIN — Medication 650 MILLIGRAM(S): at 09:30

## 2018-09-05 RX ADMIN — SODIUM CHLORIDE 83 MILLILITER(S): 9 INJECTION INTRAMUSCULAR; INTRAVENOUS; SUBCUTANEOUS at 08:08

## 2018-09-05 RX ADMIN — CEFTRIAXONE 100 GRAM(S): 500 INJECTION, POWDER, FOR SOLUTION INTRAMUSCULAR; INTRAVENOUS at 01:32

## 2018-09-05 RX ADMIN — Medication 25 MILLIGRAM(S): at 06:10

## 2018-09-05 RX ADMIN — HEPARIN SODIUM 5000 UNIT(S): 5000 INJECTION INTRAVENOUS; SUBCUTANEOUS at 17:07

## 2018-09-05 RX ADMIN — Medication 650 MILLIGRAM(S): at 08:08

## 2018-09-05 RX ADMIN — Medication 81 MILLIGRAM(S): at 17:07

## 2018-09-05 RX ADMIN — CEFTRIAXONE 100 GRAM(S): 500 INJECTION, POWDER, FOR SOLUTION INTRAMUSCULAR; INTRAVENOUS at 17:07

## 2018-09-05 RX ADMIN — SIMVASTATIN 40 MILLIGRAM(S): 20 TABLET, FILM COATED ORAL at 21:31

## 2018-09-05 RX ADMIN — HEPARIN SODIUM 5000 UNIT(S): 5000 INJECTION INTRAVENOUS; SUBCUTANEOUS at 06:10

## 2018-09-05 RX ADMIN — Medication 25 MILLIGRAM(S): at 17:07

## 2018-09-05 RX ADMIN — SODIUM CHLORIDE 83 MILLILITER(S): 9 INJECTION INTRAMUSCULAR; INTRAVENOUS; SUBCUTANEOUS at 06:11

## 2018-09-05 NOTE — CONSULT NOTE ADULT - SUBJECTIVE AND OBJECTIVE BOX
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  She is admitted for 1 day of AMS.  + faint cough and ? yellow sputum.  Asked to evaluate for AF.  EKG reviewed showing SR with APCs.  Telemetry reveals SR with APCs.  She does have a ILR in place.  History from son (occupational therapist) as pt currently altered.    PAST MEDICAL & SURGICAL HISTORY:  High cholesterol  No significant past surgical history      No Known Allergies      MEDICATIONS  (STANDING):  aspirin  chewable 81 milliGRAM(s) Oral daily  cefTRIAXone   IVPB 1 Gram(s) IV Intermittent every 24 hours  heparin  Injectable 5000 Unit(s) SubCutaneous every 12 hours  metoprolol tartrate 25 milliGRAM(s) Oral every 12 hours  simvastatin 40 milliGRAM(s) Oral at bedtime  sodium chloride 0.9%. 1000 milliLiter(s) (75 mL/Hr) IV Continuous <Continuous>    MEDICATIONS  (PRN):  acetaminophen   Tablet .. 650 milliGRAM(s) Oral every 6 hours PRN Temp greater or equal to 38C (100.4F)      Social History: no active tobacco / EtOH / IVDA    Family History:  + CVD      ROS: As noted above, otherwise unremarkable.    Vital Signs Last 24 Hrs  T(C): 37.9 (05 Sep 2018 11:02), Max: 38.8 (04 Sep 2018 20:49)  T(F): 100.2 (05 Sep 2018 11:02), Max: 101.8 (04 Sep 2018 20:49)  HR: 70 (05 Sep 2018 08:05) (70 - 118)  BP: 150/66 (05 Sep 2018 11:02) (116/71 - 167/99)  BP(mean): --  RR: 18 (05 Sep 2018 11:02) (16 - 18)  SpO2: 100% (05 Sep 2018 11:02) (96% - 100%)    I&O's Detail    I&O's Summary          PHYSICAL EXAM:  General: Appears well developed, well nourished, no acute distress  HEENT: Head: normocephalic, atraumatic  Eyes: Pupils equal and reactive  Neck: Supple, no carotid bruit, no JVD, no HJR  Chest: loop site without erythema, warmth or drainage  CARDIOVASCULAR: Normal S1 and S2, 1/6 AUDRA, 1/6 HSM, irreg (with PACs)  LUNGS: Clear to auscultation bilaterally, no rales, rhonchi or wheeze  ABDOMEN: Soft, nontender, non-distended, positive bowel sounds, no mass or bruit  EXTREMITIES: No edema, distal pulses WNL  SKIN: Warm and dry with normal turgor  NEURO: Alert & non-oriented, grossly intact  PSYCH: normal mood and affect    LABS:                        14.6   7.6   )-----------( 207      ( 04 Sep 2018 17:50 )             45.9     09-04    133<L>  |  91<L>  |  14.0  ----------------------------<  143<H>  4.0   |  24.0  |  0.77    Ca    9.6      04 Sep 2018 17:50    TPro  8.7  /  Alb  4.5  /  TBili  0.9  /  DBili  x   /  AST  35<H>  /  ALT  12  /  AlkPhos  89  09-04        PT/INR - ( 04 Sep 2018 17:50 )   PT: 9.8 sec;   INR: 0.89 ratio         PTT - ( 04 Sep 2018 17:50 )  PTT:34.3 sec      RADIOLOGY & ADDITIONAL STUDIES:  < from: Xray Chest 1 View-PORTABLE IMMEDIATE (09.04.18 @ 18:44) >  IMPRESSION: No acute cardiopulmonary disease process.    < end of copied text >    ECG: SR 71bpm with frequent APCs, nonspecific T wave changes    ECHO:< from: TTE Echo Complete w/Doppler (04.24.18 @ 10:39) >  PHYSICIAN INTERPRETATION:  Left Ventricle: The left ventricular internal cavity size is normal. Left   ventricular wall thickness is mildly increased.  Global LV systolic function was normal. Left ventricular ejection   fraction, by visual estimation, is 55 to 60%. Spectral Doppler shows   impaired relaxation patternof left ventricular myocardial filling (Grade   I diastolic dysfunction). Normal LV filling pressures.  Right Ventricle: Normal right ventricular size and function. TV S' 1.1   m/s.  Left Atrium: The left atrium is normal in size.  Right Atrium: Mildly enlarged right atrium.  Pericardium: There is no evidence of pericardial effusion.  Mitral Valve: Thickening of the anterior mitral valve leaflet. Mild   mitral valve regurgitation is seen.  Tricuspid Valve: The tricuspid valve is normal in structure. Mild   tricuspid regurgitation is visualized. Estimated pulmonary artery   systolic pressure is 37.5 mmHg assuming a right atrial pressure of 3   mmHg, which is consistent with borderline pulmonary hypertension.  Aortic Valve: The aortic valve is trileaflet. Mild aortic valve   regurgitation is seen.  Pulmonic Valve: The pulmonic valve was not well visualized. Trace   pulmonic valve regurgitation.  Aorta: The aortic root and ascending aorta are structurally normal, with   no evidence of dilitation.  Pulmonary Artery: The pulmonary artery is not well seen.  Venous: A normal flow pattern is recorded from the right upper pulmonary   vein. The inferior vena cava was normal sized, with respiratory size   variation greater than 50%.       Summary:   1. The left atrium is normal in size.   2. Normal wall motion. Left ventricular ejection fraction, by visual   estimation, is 55 to 60%. Grade I diastolic dysfunction.   3. Mildly enlarged right atrium.   4. Normal right ventricular size and function.   5. Mild mitral valve regurgitation.   6. Mild aortic regurgitation.   7. There is no evidence of pericardial effusion.   8. Hyperdynamic LV with collapsible IVC suggestive of volume depletion.     < end of copied text >    Assessment and Plan:  In summary, DANE MEDRANO is a 89y Female with past medical history significant for HLD presents with altered mental status.  Called for irregular heart rate and concern for AF.    - EKG and tele suggest SR with frequent PACs.  There are clear p waves and no obvious evidence of AF based on strips available  - Luckily she has a ILR in place.  Will arrange for interrogation  - Recent echo fro 4/2018 noted above EF 55-60%, grade 1 diastolic dysfunction, mild MR, mild AI; no need to repeat  - No evidence of ischemia or CHF clinically  - Rhythm/hemodynamics stable  - Continue ASA, statin, BB  - Keep K > 4, Mg > 2  - ABx in place, management as per medicine  - No further inpatient cardiac testing planned at the current time    We will follow with you.  Thank you for allowing me to participate in the care of your patient.      Sincerely,    Juanito Badillo MD

## 2018-09-05 NOTE — PROGRESS NOTE ADULT - ASSESSMENT
The patient is a 89 year old female with a history of hypertension and hyperlipidemia who was brought to the ER for altered mental status and worsening confusion> Noted to have a fever and decreased oral intake. Similar presentation last year when admitted for a UTI. She is forgetful at baseline, but oriented and able to complete her ADLs. In the ED, noted to be febrile. Admitted for sepsis and hyponatremia likely secondary to UTI. Given a dose of vancomycin, started on IV rocephin. Blood and urine cultures were ordered.    Assessment/Plan:      1. Sepsis secondary to UTI: IV Rocephin changed to 1 gm OD; given a dose of vancomycin Follow up urine and blood cultures.     2. Hyponatremia and metabolic acidosis likely secondary to sepsis and hypovolemia IV hydration. Monitor bmp    3. Acute toxic metabolic encephalopathy secondary to sepsis    4. Hyperlipidemia: On statin therapy    VTE- heparin subcut The patient is a 89 year old female with a history of hypertension and hyperlipidemia who was brought to the ER for altered mental status and worsening confusion> Noted to have a fever and decreased oral intake. Similar presentation last year when admitted for a UTI. She is forgetful at baseline, but oriented and able to complete her ADLs. In the ED, noted to be febrile. Admitted for sepsis and hyponatremia likely secondary to UTI. Given a dose of vancomycin, started on IV rocephin. Blood and urine cultures were ordered.    Assessment/Plan:      1. Sepsis secondary to UTI: IV Rocephin changed to 1 gm OD; given a dose of vancomycin Follow up urine and blood cultures.     2. Hyponatremia and metabolic acidosis likely secondary to sepsis and hypovolemia IV hydration. Monitor bmp    3. Acute toxic metabolic encephalopathy secondary to sepsis    4. Hyperlipidemia: On statin therapy    5. New onset afib on the monitor paroxsymal Loop recorder in place. Will consult cardio for evaluation     VTE- heparin subcut .

## 2018-09-05 NOTE — ED ADULT NURSE REASSESSMENT NOTE - NS ED NURSE REASSESS COMMENT FT1
Received report from off-going RN.  Patient awake, alert, oriented to person, in no apparent distress, resting comfortably.  Patient rectal temp 100.4, medicated as per PRN order.  Receiving NS @ 83 mL/hr well into 22g IV to left hand, negative redness or swelling.  Patient admitted, awaiting hospital bed, orders reviewed.

## 2018-09-05 NOTE — PROGRESS NOTE ADULT - SUBJECTIVE AND OBJECTIVE BOX
CC: AMS    INTERVAL HPI/OVERNIGHT EVENTS: Patient seen and examined, lethargic at the time of my examination. Will open eyes in response to her name. Tmax of 101F overnight.       Vital Signs Last 24 Hrs  T(C): 37.9 (05 Sep 2018 11:02), Max: 38.8 (04 Sep 2018 20:49)  T(F): 100.2 (05 Sep 2018 11:02), Max: 101.8 (04 Sep 2018 20:49)  HR: 70 (05 Sep 2018 08:05) (70 - 118)  BP: 150/66 (05 Sep 2018 11:02) (116/71 - 167/99)  BP(mean): --  RR: 18 (05 Sep 2018 11:02) (16 - 18)  SpO2: 100% (05 Sep 2018 11:02) (96% - 100%)    PHYSICAL EXAM:    GENERAL: NAD, AOx1  HEAD:  Atraumatic, Normocephalic  EYES: , PERRLA, conjunctiva and sclera clear  ENMT: Moist mucous membranes  NECK: Supple, No JVD  CHEST/LUNG: Clear to auscultation bilaterally; No rales, rhonchi, wheezing, or rubs  HEART: Regular rate and rhythm; No murmurs, rubs, or gallops  ABDOMEN: Soft, Nontender, Nondistended; Bowel sounds present  EXTREMITIES:  2+ Peripheral Pulses, No clubbing, cyanosis, or edema        MEDICATIONS  (STANDING):  aspirin  chewable 81 milliGRAM(s) Oral daily  cefTRIAXone   IVPB 1 Gram(s) IV Intermittent every 24 hours  heparin  Injectable 5000 Unit(s) SubCutaneous every 12 hours  metoprolol tartrate 25 milliGRAM(s) Oral every 12 hours  simvastatin 40 milliGRAM(s) Oral at bedtime  sodium chloride 0.9%. 1000 milliLiter(s) (75 mL/Hr) IV Continuous <Continuous>    MEDICATIONS  (PRN):  acetaminophen   Tablet .. 650 milliGRAM(s) Oral every 6 hours PRN Temp greater or equal to 38C (100.4F)      Allergies    No Known Allergies    Intolerances          LABS:                          14.6   7.6   )-----------( 207      ( 04 Sep 2018 17:50 )             45.9     09-04    133<L>  |  91<L>  |  14.0  ----------------------------<  143<H>  4.0   |  24.0  |  0.77    Ca    9.6      04 Sep 2018 17:50    TPro  8.7  /  Alb  4.5  /  TBili  0.9  /  DBili  x   /  AST  35<H>  /  ALT  12  /  AlkPhos  89  -    PT/INR - ( 04 Sep 2018 17:50 )   PT: 9.8 sec;   INR: 0.89 ratio         PTT - ( 04 Sep 2018 17:50 )  PTT:34.3 sec  Urinalysis Basic - ( 04 Sep 2018 18:22 )    Color: Yellow / Appearance: Clear / S.010 / pH: x  Gluc: x / Ketone: Negative  / Bili: Negative / Urobili: Negative mg/dL   Blood: x / Protein: 100 mg/dL / Nitrite: Negative   Leuk Esterase: Negative / RBC: 25-50 /HPF / WBC 0-2   Sq Epi: x / Non Sq Epi: Occasional / Bacteria: Occasional        RADIOLOGY & ADDITIONAL TESTS:

## 2018-09-05 NOTE — CONSULT NOTE ADULT - CONSULT REASON
< from: TTE Echo Complete w/Doppler (04.24.18 @ 10:39) >    PHYSICIAN INTERPRETATION:  Left Ventricle: The left ventricular internal cavity size is normal. Left   ventricular wall thickness is mildly increased.  Global LV systolic function was normal. Left ventricular ejection   fraction, by visual estimation, is 55 to 60%. Spectral Doppler shows   impaired relaxation patternof left ventricular myocardial filling (Grade   I diastolic dysfunction). Normal LV filling pressures.  Right Ventricle: Normal right ventricular size and function. TV S' 1.1   m/s.  Left Atrium: The left atrium is normal in size.  Right Atrium: Mildly enlarged right atrium.  Pericardium: There is no evidence of pericardial effusion.  Mitral Valve: Thickening of the anterior mitral valve leaflet. Mild   mitral valve regurgitation is seen.  Tricuspid Valve: The tricuspid valve is normal in structure. Mild   tricuspid regurgitation is visualized. Estimated pulmonary artery   systolic pressure is 37.5 mmHg assuming a right atrial pressure of 3   mmHg, which is consistent with borderline pulmonary hypertension.  Aortic Valve: The aortic valve is trileaflet. Mild aortic valve   regurgitation is seen.  Pulmonic Valve: The pulmonic valve was not well visualized. Trace   pulmonic valve regurgitation.  Aorta: The aortic root and ascending aorta are structurally normal, with   no evidence of dilitation.  Pulmonary Artery: The pulmonary artery is not well seen.  Venous: A normal flow pattern is recorded from the right upper pulmonary   vein. The inferior vena cava was normal sized, with respiratory size   variation greater than 50%.       Summary:   1. The left atrium is normal in size.   2. Normal wall motion. Left ventricular ejection fraction, by visual   estimation, is 55 to 60%. Grade I diastolic dysfunction.   3. Mildly enlarged right atrium.   4. Normal right ventricular size and function.   5. Mild mitral valve regurgitation.   6. Mild aortic regurgitation.   7. There is no evidence of pericardial effusion.   8. Hyperdynamic LV with collapsible IVC suggestive of volume depletion.   Suggest clinical correlation.    < end of copied text >

## 2018-09-06 LAB
ANION GAP SERPL CALC-SCNC: 14 MMOL/L — SIGNIFICANT CHANGE UP (ref 5–17)
BUN SERPL-MCNC: 13 MG/DL — SIGNIFICANT CHANGE UP (ref 8–20)
CALCIUM SERPL-MCNC: 8.8 MG/DL — SIGNIFICANT CHANGE UP (ref 8.6–10.2)
CHLORIDE SERPL-SCNC: 100 MMOL/L — SIGNIFICANT CHANGE UP (ref 98–107)
CO2 SERPL-SCNC: 23 MMOL/L — SIGNIFICANT CHANGE UP (ref 22–29)
CREAT SERPL-MCNC: 0.53 MG/DL — SIGNIFICANT CHANGE UP (ref 0.5–1.3)
GLUCOSE SERPL-MCNC: 100 MG/DL — SIGNIFICANT CHANGE UP (ref 70–115)
HCT VFR BLD CALC: 41.9 % — SIGNIFICANT CHANGE UP (ref 37–47)
HGB BLD-MCNC: 13.1 G/DL — SIGNIFICANT CHANGE UP (ref 12–16)
MCHC RBC-ENTMCNC: 28 PG — SIGNIFICANT CHANGE UP (ref 27–31)
MCHC RBC-ENTMCNC: 31.3 G/DL — LOW (ref 32–36)
MCV RBC AUTO: 89.5 FL — SIGNIFICANT CHANGE UP (ref 81–99)
PLATELET # BLD AUTO: 198 K/UL — SIGNIFICANT CHANGE UP (ref 150–400)
POTASSIUM SERPL-MCNC: 3 MMOL/L — LOW (ref 3.5–5.3)
POTASSIUM SERPL-SCNC: 3 MMOL/L — LOW (ref 3.5–5.3)
RBC # BLD: 4.68 M/UL — SIGNIFICANT CHANGE UP (ref 4.4–5.2)
RBC # FLD: 13.7 % — SIGNIFICANT CHANGE UP (ref 11–15.6)
SODIUM SERPL-SCNC: 137 MMOL/L — SIGNIFICANT CHANGE UP (ref 135–145)
TSH SERPL-MCNC: 2.21 UIU/ML — SIGNIFICANT CHANGE UP (ref 0.27–4.2)
WBC # BLD: 7.9 K/UL — SIGNIFICANT CHANGE UP (ref 4.8–10.8)
WBC # FLD AUTO: 7.9 K/UL — SIGNIFICANT CHANGE UP (ref 4.8–10.8)

## 2018-09-06 PROCEDURE — 99233 SBSQ HOSP IP/OBS HIGH 50: CPT

## 2018-09-06 RX ORDER — POTASSIUM CHLORIDE 20 MEQ
10 PACKET (EA) ORAL
Qty: 0 | Refills: 0 | Status: COMPLETED | OUTPATIENT
Start: 2018-09-06 | End: 2018-09-06

## 2018-09-06 RX ADMIN — SIMVASTATIN 40 MILLIGRAM(S): 20 TABLET, FILM COATED ORAL at 22:19

## 2018-09-06 RX ADMIN — Medication 81 MILLIGRAM(S): at 18:04

## 2018-09-06 RX ADMIN — Medication 100 MILLIEQUIVALENT(S): at 13:07

## 2018-09-06 RX ADMIN — HEPARIN SODIUM 5000 UNIT(S): 5000 INJECTION INTRAVENOUS; SUBCUTANEOUS at 06:02

## 2018-09-06 RX ADMIN — CEFTRIAXONE 100 GRAM(S): 500 INJECTION, POWDER, FOR SOLUTION INTRAMUSCULAR; INTRAVENOUS at 18:02

## 2018-09-06 RX ADMIN — HEPARIN SODIUM 5000 UNIT(S): 5000 INJECTION INTRAVENOUS; SUBCUTANEOUS at 18:05

## 2018-09-06 RX ADMIN — Medication 100 MILLIEQUIVALENT(S): at 12:07

## 2018-09-06 RX ADMIN — Medication 25 MILLIGRAM(S): at 06:02

## 2018-09-06 RX ADMIN — Medication 100 MILLIEQUIVALENT(S): at 11:07

## 2018-09-06 NOTE — PROGRESS NOTE ADULT - SUBJECTIVE AND OBJECTIVE BOX
[facility]                                                    [address], [address], [place] [address]                                                         PHONE: [phone]    FAX: [phone] [address], [address], [place] [address]                                                 PHONE: [phone]    FAX: [phone]  *******************************************************************************  cc: AMS. 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  + faint cough and ? yellow sputum.  Asked to evaluate for questionable AF.  EKG reviewed showing SR with APCs.  Telemetry reveals SR with APCs.  She does have a ILR in place, but no signal is obtainable.  Pt reports ILR was placed many years ago and either non functional or removed at this point.  Pt denies CP, SOB, palpitations, PND or orthopnea. Laying flat without symptoms.  Pt with slow speech but denies focal weakness. No dizziness or syncope. No fever, chills or constitutional symptoms reported.  .  History from chart and patient.    PAST MEDICAL & SURGICAL HISTORY:  High cholesterol  No significant past surgical history    Social History: no active tobacco / EtOH / IVDA    Family History:  + CVD unknown relatives      ROS: As noted above, otherwise unremarkable. Chart and reported ROS reviewed    No Known Allergies    Overnight events/Subjective Assessment: offers no complaints    INTERPRETATION OF TELEMETRY (personally reviewed): SR with APC's on monitor. No sustained arrhythmia       MEDICATIONS  (STANDING):  aspirin  chewable 81 milliGRAM(s) Oral daily  cefTRIAXone   IVPB 1 Gram(s) IV Intermittent every 24 hours  heparin  Injectable 5000 Unit(s) SubCutaneous every 12 hours  metoprolol tartrate 25 milliGRAM(s) Oral every 12 hours  simvastatin 40 milliGRAM(s) Oral at bedtime  sodium chloride 0.9%. 1000 milliLiter(s) (75 mL/Hr) IV Continuous <Continuous>    MEDICATIONS  (PRN):  acetaminophen   Tablet .. 650 milliGRAM(s) Oral every 6 hours PRN Temp greater or equal to 38C (100.4F)      Vital Signs Last 24 Hrs  T(C): 36.5 (06 Sep 2018 04:24), Max: 38 (05 Sep 2018 08:05)  T(F): 97.7 (06 Sep 2018 04:24), Max: 100.4 (05 Sep 2018 08:05)  HR: 61 (06 Sep 2018 04:24) (52 - 70)  BP: 118/55 (06 Sep 2018 04:24) (106/51 - 167/99)  BP(mean): --  RR: 20 (05 Sep 2018 22:43) (16 - 20)  SpO2: 95% (05 Sep 2018 22:43) (95% - 100%)    I&O's Detail    I&O's Summary          PHYSICAL EXAM:  General: Appears well developed, well nourished, no acute distress  HEENT: Head: normocephalic, atraumatic  Eyes: Pupils equal and reactive  Neck: Supple, no carotid bruit, no JVD, no HJR  CARDIOVASCULAR: Normal S1 and S2, I/VI systolic ejection murmur, 1/VI HSM, no, rub, or gallop  LUNGS: Clear to auscultation bilaterally, no rales, rhonchi or wheeze  ABDOMEN: Soft, nontender, non-distended, positive bowel sounds, no mass or bruit  EXTREMITIES: No edema, distal pulses WNL  SKIN: Warm and dry with normal turgor  NEURO: Alert & oriented x 3, grossly intact. slow speech. Answering questions  PSYCH: normal mood and affect          LABS:                        13.8   10.0  )-----------( 139      ( 05 Sep 2018 11:59 )             44.9     09-05    135  |  100  |  12.0  ----------------------------<  105  3.4<L>   |  21.0<L>  |  0.53    Ca    8.4<L>      05 Sep 2018 11:59    TPro  8.7  /  Alb  4.5  /  TBili  0.9  /  DBili  x   /  AST  35<H>  /  ALT  12  /  AlkPhos  89  09-04        PT/INR - ( 04 Sep 2018 17:50 )   PT: 9.8 sec;   INR: 0.89 ratio         PTT - ( 04 Sep 2018 17:50 )  PTT:34.3 sec  serum  Lipids:         RADIOLOGY & ADDITIONAL STUDIES:    ECG: SR. Frequent APC's. NSSTT    ECHO: < from: TTE Echo Complete w/Doppler (04.24.18 @ 10:39) >  Summary:   1. The left atrium is normal in size.   2. Normal wall motion. Left ventricular ejection fraction, by visual   estimation, is 55 to 60%. Grade I diastolic dysfunction.   3. Mildly enlarged right atrium.   4. Normal right ventricular size and function.   5. Mild mitral valve regurgitation.   6. Mild aortic regurgitation.   7. There is no evidence of pericardial effusion.   8. Hyperdynamic LV with collapsible IVC suggestive of volume depletion.   Suggest clinical correlation.    [id number] Juliana Quintero MD, RPVI, Electronically signed on 4/24/2018 at   4:08:54 PM    < end of copied text >      < from: CT Head No Cont (09.04.18 @ 19:30) >  IMPRESSION:    No acute intracranial findings.    Moderate atrophy and chronic white matter microvascular ischemic changes   as described.   If acute stroke is of clinical concern, MRI with diffusion-weighted   images would be helpful for further characterization.       < from: Xray Chest 1 View-PORTABLE IMMEDIATE (09.04.18 @ 18:44) >    IMPRESSION: No acute cardiopulmonary disease process.    < end of copied text >  BEATRIZ BAZAN M.D., ATTENDING RADIOLOGIST  This document has been electronically signed. Sep  4 2018  8:27PM    < end of copied text >    < from: US Duplex Carotid Arteries Complete, Bilateral (04.24.18 @ 09:03) >  IMPRESSION: 50-69% stenosis in the bilateral mid internal carotid   arteries..                  ANDRE LEYVA M.D., ATTENDING RADIOLOGIST  This document has been electronically signed. Apr 24 2018  9:38AM    < end of copied text >    ASSESSMENT AND PLAN:  In summary, DANE MEDRANO is a 89y Female with past medical history significant for  1 day of AMS.  + faint cough and ? yellow sputum.  Asked to evaluate for questionable AF.  EKG reviewed showing SR with APCs.  Telemetry reveals SR with APCs.  She does have a ILR in place, but no signal is obtainable on interrrogation.  Pt reports ILR was placed many years ago and it is either non functional or removed at this point (not visualized on CXR).  Pt denies CP, SOB, palpitations, PND or orthopnea. Laying flat without symptoms.  Pt with slow speech but denies focal weakness. No dizziness or syncope. No fever, chills or constitutional symptoms reported.  .  History from chart and patient.    - Telemetry monitoring overnight reviewed. EKG and tele suggest SR with frequent PACs.  There are clear p waves and no obvious evidence of paroxysmal AF based on my personal review of telemetry monitoring  - ILR interrogation with no obtainable signal. Pt reports ILR was placed many years ago. ILR not visualized on CXR and more likely has been removed in the past. However, pt on telemetry monitor without any sustained arrhythmia.  - Recent echo fro 4/2018 noted above EF 55-60%, grade 1 diastolic dysfunction, mild MR, mild AI; no need to repeat  - No evidence of ischemia or CHF clinically. Pt denies CP or symptoms to suggest ACS or CHF  - Rhythm/hemodynamics stable  - CXR without evidence of CHF. Urine no growth. Pt covered with antibiotics ( on ceftriaxone) as per medical. Afebrile. Stable hemodynamics. blood cultures sent to r/o infectious etiology of AMS  - Continue ASA, simvastatin, metoprolol  - Keep K > 4, Mg > 2  - Improved mentation, but slow speech. Non focal exam. No acute finding on head CT. Further lópez as indicated as per meidcal  - Documented carotid disease 4/24/18 with bilateral 50-69% bilaterally for which conservative medical therapy was pursued. Maintain ASA and statin for carotid disease  - Pt afebrile with controlled BP. SR and stable hemodynamics  - No further inpatient cardiac testing planned at the current time  - We will follow on a PRN bases. Please reconsult PRN as needed    Jennifer Childers MD

## 2018-09-06 NOTE — PROGRESS NOTE ADULT - SUBJECTIVE AND OBJECTIVE BOX
CC: AMS    INTERVAL HPI/OVERNIGHT EVENTS: Patient seen and examined, more awake this morning. Able to answer  her name location and address. Attempting to get out of bed multiple times despite re-orientation. Afebrile overnight.  Denies any complaints.     Vital Signs Last 24 Hrs  T(C): 36.5 (06 Sep 2018 04:24), Max: 36.9 (05 Sep 2018 17:05)  T(F): 97.7 (06 Sep 2018 04:24), Max: 98.4 (05 Sep 2018 17:05)  HR: 61 (06 Sep 2018 04:24) (52 - 65)  BP: 118/55 (06 Sep 2018 04:24) (106/51 - 118/55)  BP(mean): --  RR: 20 (05 Sep 2018 22:43) (20 - 20)  SpO2: 95% (05 Sep 2018 22:43) (95% - 100%)    PHYSICAL EXAM:    GENERAL: NAD, AOX2  HEAD:  Atraumatic, Normocephalic  EYES: EOMI, PERRLA, conjunctiva and sclera clear  ENMT: Moist mucous membranes  NECK: Supple, No JVD  CHEST/LUNG: Clear to auscultation bilaterally; No rales, rhonchi, wheezing, or rubs  HEART: Regular rate and rhythm; No murmurs, rubs, or gallops  ABDOMEN: Soft, Nontender, Nondistended; Bowel sounds present  EXTREMITIES:  2+ Peripheral Pulses, No clubbing, cyanosis, or edema        MEDICATIONS  (STANDING):  aspirin  chewable 81 milliGRAM(s) Oral daily  cefTRIAXone   IVPB 1 Gram(s) IV Intermittent every 24 hours  heparin  Injectable 5000 Unit(s) SubCutaneous every 12 hours  potassium chloride  10 mEq/100 mL IVPB 10 milliEquivalent(s) IV Intermittent every 1 hour  simvastatin 40 milliGRAM(s) Oral at bedtime  sodium chloride 0.9%. 1000 milliLiter(s) (75 mL/Hr) IV Continuous <Continuous>    MEDICATIONS  (PRN):  acetaminophen   Tablet .. 650 milliGRAM(s) Oral every 6 hours PRN Temp greater or equal to 38C (100.4F)      Allergies    No Known Allergies    Intolerances          LABS:                          13.1   7.9   )-----------( 198      ( 06 Sep 2018 07:22 )             41.9     09-06    137  |  100  |  13.0  ----------------------------<  100  3.0<L>   |  23.0  |  0.53    Ca    8.8      06 Sep 2018 07:22    TPro  8.7  /  Alb  4.5  /  TBili  0.9  /  DBili  x   /  AST  35<H>  /  ALT  12  /  AlkPhos  89  09-    PT/INR - ( 04 Sep 2018 17:50 )   PT: 9.8 sec;   INR: 0.89 ratio         PTT - ( 04 Sep 2018 17:50 )  PTT:34.3 sec  Urinalysis Basic - ( 04 Sep 2018 18:22 )    Color: Yellow / Appearance: Clear / S.010 / pH: x  Gluc: x / Ketone: Negative  / Bili: Negative / Urobili: Negative mg/dL   Blood: x / Protein: 100 mg/dL / Nitrite: Negative   Leuk Esterase: Negative / RBC: 25-50 /HPF / WBC 0-2   Sq Epi: x / Non Sq Epi: Occasional / Bacteria: Occasional        RADIOLOGY & ADDITIONAL TESTS:

## 2018-09-07 LAB
ANION GAP SERPL CALC-SCNC: 16 MMOL/L — SIGNIFICANT CHANGE UP (ref 5–17)
BUN SERPL-MCNC: 10 MG/DL — SIGNIFICANT CHANGE UP (ref 8–20)
CALCIUM SERPL-MCNC: 8.5 MG/DL — LOW (ref 8.6–10.2)
CHLORIDE SERPL-SCNC: 101 MMOL/L — SIGNIFICANT CHANGE UP (ref 98–107)
CO2 SERPL-SCNC: 20 MMOL/L — LOW (ref 22–29)
CREAT SERPL-MCNC: 0.52 MG/DL — SIGNIFICANT CHANGE UP (ref 0.5–1.3)
GLUCOSE SERPL-MCNC: 126 MG/DL — HIGH (ref 70–115)
HCT VFR BLD CALC: 42.1 % — SIGNIFICANT CHANGE UP (ref 37–47)
HGB BLD-MCNC: 13.1 G/DL — SIGNIFICANT CHANGE UP (ref 12–16)
MAGNESIUM SERPL-MCNC: 2 MG/DL — SIGNIFICANT CHANGE UP (ref 1.8–2.6)
MCHC RBC-ENTMCNC: 27.8 PG — SIGNIFICANT CHANGE UP (ref 27–31)
MCHC RBC-ENTMCNC: 31.1 G/DL — LOW (ref 32–36)
MCV RBC AUTO: 89.2 FL — SIGNIFICANT CHANGE UP (ref 81–99)
PLATELET # BLD AUTO: 212 K/UL — SIGNIFICANT CHANGE UP (ref 150–400)
POTASSIUM SERPL-MCNC: 3.2 MMOL/L — LOW (ref 3.5–5.3)
POTASSIUM SERPL-SCNC: 3.2 MMOL/L — LOW (ref 3.5–5.3)
RBC # BLD: 4.72 M/UL — SIGNIFICANT CHANGE UP (ref 4.4–5.2)
RBC # FLD: 13.5 % — SIGNIFICANT CHANGE UP (ref 11–15.6)
SODIUM SERPL-SCNC: 137 MMOL/L — SIGNIFICANT CHANGE UP (ref 135–145)
WBC # BLD: 6.8 K/UL — SIGNIFICANT CHANGE UP (ref 4.8–10.8)
WBC # FLD AUTO: 6.8 K/UL — SIGNIFICANT CHANGE UP (ref 4.8–10.8)

## 2018-09-07 PROCEDURE — 70551 MRI BRAIN STEM W/O DYE: CPT | Mod: 26

## 2018-09-07 PROCEDURE — 99233 SBSQ HOSP IP/OBS HIGH 50: CPT

## 2018-09-07 RX ORDER — RISPERIDONE 4 MG/1
0.25 TABLET ORAL AT BEDTIME
Qty: 0 | Refills: 0 | Status: DISCONTINUED | OUTPATIENT
Start: 2018-09-07 | End: 2018-09-11

## 2018-09-07 RX ORDER — SACCHAROMYCES BOULARDII 250 MG
250 POWDER IN PACKET (EA) ORAL
Qty: 0 | Refills: 0 | Status: DISCONTINUED | OUTPATIENT
Start: 2018-09-07 | End: 2018-09-11

## 2018-09-07 RX ORDER — POTASSIUM CHLORIDE 20 MEQ
40 PACKET (EA) ORAL ONCE
Qty: 0 | Refills: 0 | Status: COMPLETED | OUTPATIENT
Start: 2018-09-07 | End: 2018-09-07

## 2018-09-07 RX ORDER — CEFPODOXIME PROXETIL 100 MG
100 TABLET ORAL EVERY 12 HOURS
Qty: 0 | Refills: 0 | Status: COMPLETED | OUTPATIENT
Start: 2018-09-07 | End: 2018-09-10

## 2018-09-07 RX ADMIN — Medication 1 MILLIGRAM(S): at 14:59

## 2018-09-07 RX ADMIN — Medication 100 MILLIGRAM(S): at 18:12

## 2018-09-07 RX ADMIN — HEPARIN SODIUM 5000 UNIT(S): 5000 INJECTION INTRAVENOUS; SUBCUTANEOUS at 18:12

## 2018-09-07 RX ADMIN — RISPERIDONE 0.25 MILLIGRAM(S): 4 TABLET ORAL at 22:41

## 2018-09-07 RX ADMIN — Medication 81 MILLIGRAM(S): at 14:33

## 2018-09-07 RX ADMIN — Medication 40 MILLIEQUIVALENT(S): at 14:34

## 2018-09-07 RX ADMIN — SIMVASTATIN 40 MILLIGRAM(S): 20 TABLET, FILM COATED ORAL at 22:41

## 2018-09-07 RX ADMIN — Medication 250 MILLIGRAM(S): at 18:12

## 2018-09-07 RX ADMIN — HEPARIN SODIUM 5000 UNIT(S): 5000 INJECTION INTRAVENOUS; SUBCUTANEOUS at 06:19

## 2018-09-07 NOTE — PHYSICAL THERAPY INITIAL EVALUATION ADULT - CRITERIA FOR SKILLED THERAPEUTIC INTERVENTIONS
predicted duration of therapy intervention/anticipated discharge recommendation/therapy frequency/impairments found/functional limitations in following categories/rehab potential

## 2018-09-07 NOTE — PROGRESS NOTE ADULT - SUBJECTIVE AND OBJECTIVE BOX
CC: AMS    INTERVAL HPI/OVERNIGHT EVENTS: Patient seen and examined, disorganized this morning attempting to get out bed. Says she is at home and about to eat dinner. Spoke with family and this is not her baseline. Col[place] in place for safety. No afebrile. Unable to obtain ROS given mentation.       Vital Signs Last 24 Hrs  T(C): 36.8 (07 Sep 2018 04:15), Max: 36.8 (06 Sep 2018 16:14)  T(F): 98.3 (07 Sep 2018 04:15), Max: 98.3 (07 Sep 2018 04:15)  HR: 85 (07 Sep 2018 04:15) (67 - 90)  BP: 142/68 (07 Sep 2018 04:15) (116/62 - 143/61)  BP(mean): --  RR: 18 (07 Sep 2018 04:15) (18 - 20)  SpO2: 97% (06 Sep 2018 20:00) (91% - 97%)    PHYSICAL EXAM:    GENERAL: NAD, AOX1   HEAD:  Atraumatic, Normocephalic  EYES: EOMI, PERRLA, conjunctiva and sclera clear  ENMT: Moist mucous membranes  NECK: Supple, No JVD  CHEST/LUNG: Clear to auscultation bilaterally; No rales, rhonchi, wheezing, or rubs  HEART: Regular rate and rhythm; No murmurs, rubs, or gallops  ABDOMEN: Soft, Nontender, Nondistended; Bowel sounds present  EXTREMITIES:  2+ Peripheral Pulses, No clubbing, cyanosis, or edema        MEDICATIONS  (STANDING):  aspirin  chewable 81 milliGRAM(s) Oral daily  cefpodoxime 100 milliGRAM(s) Oral every 12 hours  heparin  Injectable 5000 Unit(s) SubCutaneous every 12 hours  potassium chloride    Tablet ER 40 milliEquivalent(s) Oral once  saccharomyces boulardii 250 milliGRAM(s) Oral two times a day  simvastatin 40 milliGRAM(s) Oral at bedtime    MEDICATIONS  (PRN):  acetaminophen   Tablet .. 650 milliGRAM(s) Oral every 6 hours PRN Temp greater or equal to 38C (100.4F)      Allergies    No Known Allergies    Intolerances          LABS:                          13.1   6.8   )-----------( 212      ( 07 Sep 2018 07:23 )             42.1     09-07    137  |  101  |  10.0  ----------------------------<  126<H>  3.2<L>   |  20.0<L>  |  0.52    Ca    8.5<L>      07 Sep 2018 07:23  Mg     2.0     09-07            RADIOLOGY & ADDITIONAL TESTS:

## 2018-09-07 NOTE — PHYSICAL THERAPY INITIAL EVALUATION ADULT - GAIT PATTERN USED, PT EVAL
decreased sharri, gait velocity, activity tolerance, upright posture, needs max verbal cues for sequencing and redirect focus to task, assist for steadying especially c turns and obstacle negotiation,

## 2018-09-07 NOTE — PHYSICAL THERAPY INITIAL EVALUATION ADULT - ADDITIONAL COMMENTS
pt modified I RW c all functional mobility prior to admission. pt has 1 platform DI, 4 steps 2 rails inside to go up and stair glider for last 8 steps within home. pt owns equipment SAC and RW

## 2018-09-07 NOTE — PHYSICAL THERAPY INITIAL EVALUATION ADULT - IMPAIRMENTS FOUND, PT EVAL
aerobic capacity/endurance/arousal, attention, and cognition/muscle strength/posture/gait, locomotion, and balance

## 2018-09-07 NOTE — PROGRESS NOTE ADULT - ASSESSMENT
The patient is a 89 year old female with a history of hypertension and hyperlipidemia who was brought to the ER for altered mental status and worsening confusion> Noted to have a fever and decreased oral intake. Similar presentation last year when admitted for a UTI. She is forgetful at baseline, but oriented and able to complete her ADLs. In the ED, noted to be febrile. Admitted for sepsis and hyponatremia likely secondary to UTI. Given a dose of vancomycin, started on IV rocephin CT head on admission negative.. Blood and urine cultures were negative. Changed to PO vantin to complete a 7 day course. Spoke with family, patient increasingly confused and disorganized. MRI of the brain ordered to rule out CVA>     Assessment/Plan:      1. Sepsis secondary to UTI: Blood and urine cultures negative. Given fever on admission will changed to PO vantin to complete 7 day course. Florastor added  Sepsis now resolved    2. Hyponatremia and metabolic acidosis likely secondary to sepsis and hypovolemia Improved with iv hydration. MOnitor bmp     3. Acute toxic metabolic encephalopathy possibly to sepsis vs CVA: Vladislav in place for safety, MR of the brain ordered to rule out CVA.    4. Hyperlipidemia: On statin therapy    5. PVCs- Evaluated by cardiology, no episodes of afib on the monitor. Telemetry discontinued  Correct electrolytes    6. Hypokalemia; Replace KCL> monitor lytes. Check magnesium      VTE- heparin subcut .  PT evaluation- MELIZA when medically stable for discharge    Spoke with patient's son Darrius and updated on care plan.     Called and left message for patient's son Darrius. The patient is a 89 year old female with a history of hypertension and hyperlipidemia who was brought to the ER for altered mental status and worsening confusion> Noted to have a fever and decreased oral intake. Similar presentation last year when admitted for a UTI. She is forgetful at baseline, but oriented and able to complete her ADLs. In the ED, noted to be febrile. Admitted for sepsis and hyponatremia likely secondary to UTI. Given a dose of vancomycin, started on IV rocephin CT head on admission negative.. Blood and urine cultures were negative. Changed to PO vantin to complete a 7 day course. Spoke with family, patient increasingly confused and disorganized. MRI of the brain ordered to rule out CVA>     Assessment/Plan:      1. Sepsis secondary to UTI: Blood and urine cultures negative. Given fever on admission will changed to PO vantin to complete 7 day course. Florastor added  Sepsis now resolved    2. Hyponatremia and metabolic acidosis likely secondary to sepsis and hypovolemia Improved with iv hydration. MOnitor bmp     3. Acute toxic metabolic encephalopathy possibly to sepsis vs CVA: Beauregard in place for safety, MR of the brain ordered to rule out CVA.    4. Hyperlipidemia: On statin therapy    5. PVCs- Evaluated by cardiology, no episodes of afib on the monitor. Telemetry discontinued  Correct electrolytes    6. Hypokalemia; Replace KCL> monitor lytes. Check magnesium      VTE- heparin subcut .  PT evaluation- MELIZA when medically stable for discharge    Spoke with patient's son Darrius and updated on care plan.

## 2018-09-08 LAB
ANION GAP SERPL CALC-SCNC: 14 MMOL/L — SIGNIFICANT CHANGE UP (ref 5–17)
BUN SERPL-MCNC: 14 MG/DL — SIGNIFICANT CHANGE UP (ref 8–20)
CALCIUM SERPL-MCNC: 9 MG/DL — SIGNIFICANT CHANGE UP (ref 8.6–10.2)
CHLORIDE SERPL-SCNC: 103 MMOL/L — SIGNIFICANT CHANGE UP (ref 98–107)
CO2 SERPL-SCNC: 24 MMOL/L — SIGNIFICANT CHANGE UP (ref 22–29)
CREAT SERPL-MCNC: 0.52 MG/DL — SIGNIFICANT CHANGE UP (ref 0.5–1.3)
GLUCOSE SERPL-MCNC: 113 MG/DL — SIGNIFICANT CHANGE UP (ref 70–115)
HCT VFR BLD CALC: 41.4 % — SIGNIFICANT CHANGE UP (ref 37–47)
HGB BLD-MCNC: 13.2 G/DL — SIGNIFICANT CHANGE UP (ref 12–16)
MAGNESIUM SERPL-MCNC: 2.3 MG/DL — SIGNIFICANT CHANGE UP (ref 1.6–2.6)
MCHC RBC-ENTMCNC: 27.8 PG — SIGNIFICANT CHANGE UP (ref 27–31)
MCHC RBC-ENTMCNC: 31.9 G/DL — LOW (ref 32–36)
MCV RBC AUTO: 87.2 FL — SIGNIFICANT CHANGE UP (ref 81–99)
PLATELET # BLD AUTO: 246 K/UL — SIGNIFICANT CHANGE UP (ref 150–400)
POTASSIUM SERPL-MCNC: 3.9 MMOL/L — SIGNIFICANT CHANGE UP (ref 3.5–5.3)
POTASSIUM SERPL-SCNC: 3.9 MMOL/L — SIGNIFICANT CHANGE UP (ref 3.5–5.3)
RBC # BLD: 4.75 M/UL — SIGNIFICANT CHANGE UP (ref 4.4–5.2)
RBC # FLD: 13.5 % — SIGNIFICANT CHANGE UP (ref 11–15.6)
SODIUM SERPL-SCNC: 141 MMOL/L — SIGNIFICANT CHANGE UP (ref 135–145)
WBC # BLD: 4.8 K/UL — SIGNIFICANT CHANGE UP (ref 4.8–10.8)
WBC # FLD AUTO: 4.8 K/UL — SIGNIFICANT CHANGE UP (ref 4.8–10.8)

## 2018-09-08 PROCEDURE — 99233 SBSQ HOSP IP/OBS HIGH 50: CPT

## 2018-09-08 RX ADMIN — SIMVASTATIN 40 MILLIGRAM(S): 20 TABLET, FILM COATED ORAL at 21:31

## 2018-09-08 RX ADMIN — Medication 250 MILLIGRAM(S): at 17:13

## 2018-09-08 RX ADMIN — RISPERIDONE 0.25 MILLIGRAM(S): 4 TABLET ORAL at 21:31

## 2018-09-08 RX ADMIN — Medication 250 MILLIGRAM(S): at 05:44

## 2018-09-08 RX ADMIN — HEPARIN SODIUM 5000 UNIT(S): 5000 INJECTION INTRAVENOUS; SUBCUTANEOUS at 05:44

## 2018-09-08 RX ADMIN — HEPARIN SODIUM 5000 UNIT(S): 5000 INJECTION INTRAVENOUS; SUBCUTANEOUS at 17:12

## 2018-09-08 RX ADMIN — Medication 100 MILLIGRAM(S): at 17:12

## 2018-09-08 RX ADMIN — Medication 100 MILLIGRAM(S): at 05:44

## 2018-09-08 RX ADMIN — Medication 81 MILLIGRAM(S): at 17:12

## 2018-09-08 NOTE — PROGRESS NOTE ADULT - SUBJECTIVE AND OBJECTIVE BOX
CC: AMS    INTERVAL HPI/OVERNIGHT EVENTS: Patient seen and examined, confused. denied complaints. Lawrence in place for safety. No afebrile. Unable to obtain ROS given mentation. answered all qs to no.           PHYSICAL EXAM:  GENERAL: Not in distress. Alert    HEENT:  Normocephalic and atraumatic. PEARLA,EOMI  NECK: Supple.  No JVD.    CARDIOVASCULAR: RRR S1, S2. No murmur/rubs/gallop  LUNGS: BLAE+, no rales, no wheezing, no rhonchi.    ABDOMEN: ND. Soft,  NT, no guarding / rebound / rigidity. BS normoactive. No CVA tenderness.    EXTREMITIES: no cyanosis, no clubbing, no edema.   SKIN: no rash.   NEUROLOGIC: confused. grossly intact  PSYCHIATRIC: Calm.  No agitation.    MEDICATIONS  (STANDING):  aspirin  chewable 81 milliGRAM(s) Oral daily  cefpodoxime 100 milliGRAM(s) Oral every 12 hours  heparin  Injectable 5000 Unit(s) SubCutaneous every 12 hours  risperiDONE   Tablet 0.25 milliGRAM(s) Oral at bedtime  saccharomyces boulardii 250 milliGRAM(s) Oral two times a day  simvastatin 40 milliGRAM(s) Oral at bedtime    MEDICATIONS  (PRN):  acetaminophen   Tablet .. 650 milliGRAM(s) Oral every 6 hours PRN Temp greater or equal to 38C (100.4F)      Allergies    No Known Allergies    Intolerances      Labs:                          13.2   4.8   )-----------( 246      ( 08 Sep 2018 09:27 )             41.4       09-08    141  |  103  |  14.0  ----------------------------<  113  3.9   |  24.0  |  0.52    Ca    9.0      08 Sep 2018 09:27  Mg     2.3     09-08      RADIOLOGY & ADDITIONAL TESTS:    < from: MR Head No Cont (09.07.18 @ 15:44) >  IMPRESSION: No acute infarction.    < end of copied text >

## 2018-09-08 NOTE — PROGRESS NOTE ADULT - ASSESSMENT
The patient is a 89 year old female with a history of hypertension and hyperlipidemia who was brought to the ER for altered mental status and worsening confusion> Noted to have a fever and decreased oral intake. Similar presentation last year when admitted for a UTI. She is forgetful at baseline, but oriented and able to complete her ADLs. In the ED, noted to be febrile. Admitted for sepsis and hyponatremia likely secondary to UTI. Given a dose of vancomycin, started on IV rocephin CT head on admission negative.. Blood and urine cultures were negative. Changed to PO vantin to complete a 7 day course. Spoke with family, patient increasingly confused and disorganized. MRI of the brain ordered to rule out CVA>     Assessment/Plan:      1. Sepsis secondary to UTI: Blood and urine cultures negative. on PO vantin to complete 7 day course. Florastor  Sepsis now resolved    2. Hyponatremia and metabolic acidosis likely secondary to sepsis and hypovolemia Improved with iv hydration. MOnitor bmp     3. Acute toxic metabolic encephalopathy possibly to sepsis vs CVA: [place] in place for safety, MR of the brain neg for acute events. call family about baseline MS.     4. Hyperlipidemia: On statin therapy    5. PVCs- Evaluated by cardiology, no episodes of afib on the monitor. Telemetry discontinued  Correct electrolytes    6. Hypokalemia; normal K today. monitor electrolytes      VTE- heparin subcut .    PT evaluation- MELIZA when medically stable for discharge

## 2018-09-09 PROCEDURE — 99233 SBSQ HOSP IP/OBS HIGH 50: CPT

## 2018-09-09 RX ADMIN — HEPARIN SODIUM 5000 UNIT(S): 5000 INJECTION INTRAVENOUS; SUBCUTANEOUS at 17:37

## 2018-09-09 RX ADMIN — RISPERIDONE 0.25 MILLIGRAM(S): 4 TABLET ORAL at 22:12

## 2018-09-09 RX ADMIN — HEPARIN SODIUM 5000 UNIT(S): 5000 INJECTION INTRAVENOUS; SUBCUTANEOUS at 05:41

## 2018-09-09 RX ADMIN — SIMVASTATIN 40 MILLIGRAM(S): 20 TABLET, FILM COATED ORAL at 22:12

## 2018-09-09 RX ADMIN — Medication 100 MILLIGRAM(S): at 05:41

## 2018-09-09 RX ADMIN — Medication 100 MILLIGRAM(S): at 17:37

## 2018-09-09 RX ADMIN — Medication 250 MILLIGRAM(S): at 05:41

## 2018-09-09 RX ADMIN — Medication 250 MILLIGRAM(S): at 17:37

## 2018-09-09 RX ADMIN — Medication 81 MILLIGRAM(S): at 11:58

## 2018-09-09 NOTE — PROGRESS NOTE ADULT - SUBJECTIVE AND OBJECTIVE BOX
CC: AMS    INTERVAL HPI/OVERNIGHT EVENTS: Patient seen and examined, confused. denied complaints. Linn in place for safety. No afebrile. Unable to obtain ROS given mentation. answered all qs to no.           PHYSICAL EXAM:  GENERAL: Not in distress. Alert    HEENT:  Normocephalic and atraumatic  NECK: Supple.    CARDIOVASCULAR: RRR S1, S2. No murmur/rubs/gallop  LUNGS: BLAE+, no rales, no wheezing, no rhonchi.    ABDOMEN: ND. Soft,  NT, no guarding / rebound / rigidity. BS normoactive. No CVA tenderness.    EXTREMITIES: no cyanosis, no clubbing, no edema.   SKIN: no rash.   NEUROLOGIC: confused. grossly intact  PSYCHIATRIC: Calm.  No agitation.    MEDICATIONS  (STANDING):  aspirin  chewable 81 milliGRAM(s) Oral daily  cefpodoxime 100 milliGRAM(s) Oral every 12 hours  heparin  Injectable 5000 Unit(s) SubCutaneous every 12 hours  risperiDONE   Tablet 0.25 milliGRAM(s) Oral at bedtime  saccharomyces boulardii 250 milliGRAM(s) Oral two times a day  simvastatin 40 milliGRAM(s) Oral at bedtime    MEDICATIONS  (PRN):  acetaminophen   Tablet .. 650 milliGRAM(s) Oral every 6 hours PRN Temp greater or equal to 38C (100.4F)      Allergies    No Known Allergies    Intolerances      Labs:                                     13.2   4.8   )-----------( 246      ( 08 Sep 2018 09:27 )             41.4       09-08    141  |  103  |  14.0  ----------------------------<  113  3.9   |  24.0  |  0.52    Ca    9.0      08 Sep 2018 09:27  Mg     2.3     09-08          RADIOLOGY & ADDITIONAL TESTS:    < from: MR Head No Cont (09.07.18 @ 15:44) >  IMPRESSION: No acute infarction.    < end of copied text >

## 2018-09-10 DIAGNOSIS — R41.89 OTHER SYMPTOMS AND SIGNS INVOLVING COGNITIVE FUNCTIONS AND AWARENESS: ICD-10-CM

## 2018-09-10 DIAGNOSIS — N39.0 URINARY TRACT INFECTION, SITE NOT SPECIFIED: ICD-10-CM

## 2018-09-10 DIAGNOSIS — F05 DELIRIUM DUE TO KNOWN PHYSIOLOGICAL CONDITION: ICD-10-CM

## 2018-09-10 LAB
HCT VFR BLD CALC: 40.3 % — SIGNIFICANT CHANGE UP (ref 37–47)
HGB BLD-MCNC: 12.8 G/DL — SIGNIFICANT CHANGE UP (ref 12–16)
MCHC RBC-ENTMCNC: 28.1 PG — SIGNIFICANT CHANGE UP (ref 27–31)
MCHC RBC-ENTMCNC: 31.8 G/DL — LOW (ref 32–36)
MCV RBC AUTO: 88.6 FL — SIGNIFICANT CHANGE UP (ref 81–99)
PLATELET # BLD AUTO: 273 K/UL — SIGNIFICANT CHANGE UP (ref 150–400)
RBC # BLD: 4.55 M/UL — SIGNIFICANT CHANGE UP (ref 4.4–5.2)
RBC # FLD: 13.8 % — SIGNIFICANT CHANGE UP (ref 11–15.6)
T PALLIDUM AB TITR SER: NEGATIVE — SIGNIFICANT CHANGE UP
TSH SERPL-MCNC: 1.47 UIU/ML — SIGNIFICANT CHANGE UP (ref 0.27–4.2)
VIT B12 SERPL-MCNC: 1807 PG/ML — HIGH (ref 232–1245)
WBC # BLD: 4.1 K/UL — LOW (ref 4.8–10.8)
WBC # FLD AUTO: 4.1 K/UL — LOW (ref 4.8–10.8)

## 2018-09-10 PROCEDURE — 99233 SBSQ HOSP IP/OBS HIGH 50: CPT

## 2018-09-10 PROCEDURE — 90792 PSYCH DIAG EVAL W/MED SRVCS: CPT

## 2018-09-10 RX ADMIN — Medication 250 MILLIGRAM(S): at 17:40

## 2018-09-10 RX ADMIN — RISPERIDONE 0.25 MILLIGRAM(S): 4 TABLET ORAL at 21:37

## 2018-09-10 RX ADMIN — HEPARIN SODIUM 5000 UNIT(S): 5000 INJECTION INTRAVENOUS; SUBCUTANEOUS at 17:39

## 2018-09-10 RX ADMIN — HEPARIN SODIUM 5000 UNIT(S): 5000 INJECTION INTRAVENOUS; SUBCUTANEOUS at 05:54

## 2018-09-10 RX ADMIN — SIMVASTATIN 40 MILLIGRAM(S): 20 TABLET, FILM COATED ORAL at 21:37

## 2018-09-10 RX ADMIN — Medication 100 MILLIGRAM(S): at 05:54

## 2018-09-10 RX ADMIN — Medication 81 MILLIGRAM(S): at 12:39

## 2018-09-10 RX ADMIN — Medication 100 MILLIGRAM(S): at 17:40

## 2018-09-10 RX ADMIN — Medication 250 MILLIGRAM(S): at 05:54

## 2018-09-10 NOTE — DIETITIAN INITIAL EVALUATION ADULT. - OTHER INFO
BMI 20.5, Pt eating well this am. Spoke with pt. Pt denied recent weight changes. Follows regular diet at home.

## 2018-09-10 NOTE — PROGRESS NOTE ADULT - ASSESSMENT
The patient is a 89 year old female with a history of hypertension and hyperlipidemia who was brought to the ER for altered mental status and worsening confusion> Noted to have a fever and decreased oral intake. Similar presentation last year when admitted for a UTI. She is forgetful at baseline, but oriented and able to complete her ADLs. In the ED, noted to be febrile. Admitted for sepsis and hyponatremia likely secondary to UTI. Given a dose of vancomycin, started on IV rocephin CT head on admission negative.. Blood and urine cultures were negative. Changed to PO vantin to complete a 7 day course. Spoke with family, patient increasingly confused and disorganized. MRI of the brain ordered to rule out CVA>     Assessment/Plan:      1. inially Sepsis secondary to UTI: however Blood and urine cultures negative. CXR  neg. on PO vantin to complete 7 day course. Florastor  Sepsis now resolved    2. Hyponatremia and metabolic acidosis likely secondary to sepsis and hypovolemia Improved with iv hydration. MOnitor bmp     3. Acute toxic metabolic encephalopathy possibly to sepsis with underlying dementia , family denied any personal or family d/o dementia. ? pseudodementia in elderly: Posey in place for safety, MR of the brain neg for acute events. psych eval called.      4. Hyperlipidemia: On statin therapy    5. PVCs- Evaluated by cardiology, no episodes of afib on the monitor. Telemetry discontinued  Correct electrolytes    6. Hypokalemia; normal K today. monitor electrolytes      VTE- heparin subcut .    PT evaluation- MELIZA when medically stable for discharge.PT eval appreciated

## 2018-09-10 NOTE — DIETITIAN INITIAL EVALUATION ADULT. - PERTINENT LABORATORY DATA
09-08 Na141 mmol/L Glu 113 mg/dL K+ 3.9 mmol/L Cr  0.52 mg/dL BUN 14.0 mg/dL Phos n/a   Alb n/a   PAB n/a

## 2018-09-10 NOTE — PROGRESS NOTE ADULT - SUBJECTIVE AND OBJECTIVE BOX
CC: AMS    INTERVAL HPI/OVERNIGHT EVENTS: Patient seen and examined, confused. denied complaints. No afebrile. Unable to obtain ROS given mentation. answered all qs to no. spoke to family members at baseline patient is AAO*3. she was introverted lately with less interest to attend family events. otherwise family did not notice any acute or chronic mental status changes. she goes to [employer] and independent at her ADL and most of her IADL. awaiting MELIZA placement.      PHYSICAL EXAM:  GENERAL: Not in distress. Alert .pleasant  HEENT:  Normocephalic and atraumatic. no pallor  NECK: Supple.    CARDIOVASCULAR: RRR S1, S2. No murmur/rubs/gallop  LUNGS: BLAE+, no rales, no wheezing, no rhonchi.    ABDOMEN: ND. Soft,  NT, no guarding / rebound / rigidity.   EXTREMITIES: no cyanosis, no clubbing, no edema.   SKIN: no rash.   NEUROLOGIC: confused. grossly intact  PSYCHIATRIC: Calm.  No agitation.    MEDICATIONS  (STANDING):  aspirin  chewable 81 milliGRAM(s) Oral daily  cefpodoxime 100 milliGRAM(s) Oral every 12 hours  heparin  Injectable 5000 Unit(s) SubCutaneous every 12 hours  risperiDONE   Tablet 0.25 milliGRAM(s) Oral at bedtime  saccharomyces boulardii 250 milliGRAM(s) Oral two times a day  simvastatin 40 milliGRAM(s) Oral at bedtime    MEDICATIONS  (PRN):  acetaminophen   Tablet .. 650 milliGRAM(s) Oral every 6 hours PRN Temp greater or equal to 38C (100.4F)      Allergies    No Known Allergies    Intolerances      Labs:                          12.8   4.1   )-----------( 273      ( 10 Sep 2018 09:02 )             40.3       Culture - Blood (09.04.18 @ 18:24)    Specimen Source: .Blood Blood-Peripheral    Culture Results:   No growth at 5 days.    Culture - Urine (09.04.18 @ 18:23)    Specimen Source: .Urine Clean Catch (Midstream)    Culture Results:   No growth        RADIOLOGY & ADDITIONAL TESTS:    < from: MR Head No Cont (09.07.18 @ 15:44) >  IMPRESSION: No acute infarction.    < end of copied text >    < from: Xray Chest 1 View-PORTABLE IMMEDIATE (09.04.18 @ 18:44) >    IMPRESSION: No acute cardiopulmonary disease process.    < end of copied text >

## 2018-09-10 NOTE — BEHAVIORAL HEALTH ASSESSMENT NOTE - SUMMARY
89 year old female with a history of hypertension and hyperlipidemia , cognitive/functional IADL deficits at baseline, prior hx of UTI induced delirium with confusion in context of UTI consistent with delirium    discussed risks/benefits of antipsychotic course with son who declines, advised benefit is shortening duration of delirium, he prefers conservative approach.   no imminent need for antipsychotic given lack of aggression or psychotic symptoms    avoid benzodiazepines or anticholinergics  check folate  if becomes acutely dangerous may give haldol 0.5 mg IM , give additional 0.5 mg IM if no response

## 2018-09-10 NOTE — BEHAVIORAL HEALTH ASSESSMENT NOTE - HPI (INCLUDE ILLNESS QUALITY, SEVERITY, DURATION, TIMING, CONTEXT, MODIFYING FACTORS, ASSOCIATED SIGNS AND SYMPTOMS)
89 year old woman, history of HTN and HLD, prior episode of UTI induced delirum, admitted found to have sespis secondaqry to UTI, undergoing antibiotic course.  patient noted to be confused since admission  called qiana Rizo who states baseline is oriented x 3, needs assistance with all IALD,  has difficulty remembering grandchildren names, still able to cook basic meals and do ADL, at baseline no sleep or appetite issues, no psychiatric hx or aggression, or substance abuse.  He has noticed patient appears cognitively slowed down since admission, repeats self over again forgetting some names. At his last contacted with patient in hospital she was oriented to place.   He reports pt with hx of multiple falls most recently 1 year ago.     Patient reports stable mood denies AH, VH paranoia SI HI, reports good sleep and appetite, feels safe. States she used to work at [employer] and does feel confused at times.

## 2018-09-10 NOTE — BEHAVIORAL HEALTH ASSESSMENT NOTE - NSBHCHARTREVIEWVS_PSY_A_CORE FT
Vital Signs Last 24 Hrs  T(C): 36.8 (10 Sep 2018 11:12), Max: 37.2 (09 Sep 2018 17:16)  T(F): 98.3 (10 Sep 2018 11:12), Max: 98.9 (09 Sep 2018 17:16)  HR: 89 (10 Sep 2018 11:12) (81 - 101)  BP: 95/63 (10 Sep 2018 11:12) (95/63 - 119/62)  BP(mean): --  RR: 18 (10 Sep 2018 11:12) (17 - 20)  SpO2: 98% (10 Sep 2018 05:26) (96% - 98%)

## 2018-09-10 NOTE — BEHAVIORAL HEALTH ASSESSMENT NOTE - NSBHCONSULTFOLLOWAFTERCARE_PSY_A_CORE FT
MELIZA as recommended by primary team  recommend outpatient fu with PCP and or neurology to evaluate cognitive changes

## 2018-09-10 NOTE — BEHAVIORAL HEALTH ASSESSMENT NOTE - RISK ASSESSMENT
No hx of psychiatric disease , substance abuse or suicide attempts, has supportive family attends [employer], not at imminent risk of harm to self or others.

## 2018-09-11 ENCOUNTER — TRANSCRIPTION ENCOUNTER (OUTPATIENT)
Age: 83
End: 2018-09-11

## 2018-09-11 VITALS
OXYGEN SATURATION: 97 % | DIASTOLIC BLOOD PRESSURE: 78 MMHG | HEART RATE: 95 BPM | RESPIRATION RATE: 18 BRPM | TEMPERATURE: 98 F | SYSTOLIC BLOOD PRESSURE: 139 MMHG

## 2018-09-11 LAB
FOLATE RBC-MCNC: 2422 NG/ML — HIGH (ref 499–1504)
HCT VFR BLD CALC: 41 % — SIGNIFICANT CHANGE UP (ref 34–45)

## 2018-09-11 PROCEDURE — 80053 COMPREHEN METABOLIC PANEL: CPT

## 2018-09-11 PROCEDURE — 85027 COMPLETE CBC AUTOMATED: CPT

## 2018-09-11 PROCEDURE — 82803 BLOOD GASES ANY COMBINATION: CPT

## 2018-09-11 PROCEDURE — 81001 URINALYSIS AUTO W/SCOPE: CPT

## 2018-09-11 PROCEDURE — 36600 WITHDRAWAL OF ARTERIAL BLOOD: CPT

## 2018-09-11 PROCEDURE — 85730 THROMBOPLASTIN TIME PARTIAL: CPT

## 2018-09-11 PROCEDURE — 82140 ASSAY OF AMMONIA: CPT

## 2018-09-11 PROCEDURE — 87086 URINE CULTURE/COLONY COUNT: CPT

## 2018-09-11 PROCEDURE — 97116 GAIT TRAINING THERAPY: CPT

## 2018-09-11 PROCEDURE — 70450 CT HEAD/BRAIN W/O DYE: CPT

## 2018-09-11 PROCEDURE — 82747 ASSAY OF FOLIC ACID RBC: CPT

## 2018-09-11 PROCEDURE — 93005 ELECTROCARDIOGRAM TRACING: CPT

## 2018-09-11 PROCEDURE — 70551 MRI BRAIN STEM W/O DYE: CPT

## 2018-09-11 PROCEDURE — 87040 BLOOD CULTURE FOR BACTERIA: CPT

## 2018-09-11 PROCEDURE — 86780 TREPONEMA PALLIDUM: CPT

## 2018-09-11 PROCEDURE — 96365 THER/PROPH/DIAG IV INF INIT: CPT

## 2018-09-11 PROCEDURE — 84443 ASSAY THYROID STIM HORMONE: CPT

## 2018-09-11 PROCEDURE — 99239 HOSP IP/OBS DSCHRG MGMT >30: CPT

## 2018-09-11 PROCEDURE — 83735 ASSAY OF MAGNESIUM: CPT

## 2018-09-11 PROCEDURE — 97163 PT EVAL HIGH COMPLEX 45 MIN: CPT

## 2018-09-11 PROCEDURE — 36415 COLL VENOUS BLD VENIPUNCTURE: CPT

## 2018-09-11 PROCEDURE — 97110 THERAPEUTIC EXERCISES: CPT

## 2018-09-11 PROCEDURE — 71045 X-RAY EXAM CHEST 1 VIEW: CPT

## 2018-09-11 PROCEDURE — 97530 THERAPEUTIC ACTIVITIES: CPT

## 2018-09-11 PROCEDURE — 96361 HYDRATE IV INFUSION ADD-ON: CPT

## 2018-09-11 PROCEDURE — 99285 EMERGENCY DEPT VISIT HI MDM: CPT | Mod: 25

## 2018-09-11 PROCEDURE — 85610 PROTHROMBIN TIME: CPT

## 2018-09-11 PROCEDURE — 80048 BASIC METABOLIC PNL TOTAL CA: CPT

## 2018-09-11 PROCEDURE — 96375 TX/PRO/DX INJ NEW DRUG ADDON: CPT

## 2018-09-11 PROCEDURE — 82607 VITAMIN B-12: CPT

## 2018-09-11 PROCEDURE — 83605 ASSAY OF LACTIC ACID: CPT

## 2018-09-11 RX ORDER — SIMVASTATIN 20 MG/1
1 TABLET, FILM COATED ORAL
Qty: 0 | Refills: 0 | COMMUNITY

## 2018-09-11 RX ORDER — ASPIRIN/CALCIUM CARB/MAGNESIUM 324 MG
1 TABLET ORAL
Qty: 0 | Refills: 0 | COMMUNITY
Start: 2018-09-11

## 2018-09-11 RX ORDER — SACCHAROMYCES BOULARDII 250 MG
1 POWDER IN PACKET (EA) ORAL
Qty: 0 | Refills: 0 | COMMUNITY
Start: 2018-09-11

## 2018-09-11 RX ORDER — ACETAMINOPHEN 500 MG
2 TABLET ORAL
Qty: 0 | Refills: 0 | COMMUNITY
Start: 2018-09-11

## 2018-09-11 RX ORDER — SIMVASTATIN 20 MG/1
1 TABLET, FILM COATED ORAL
Qty: 0 | Refills: 0 | COMMUNITY
Start: 2018-09-11

## 2018-09-11 RX ORDER — ASPIRIN/CALCIUM CARB/MAGNESIUM 324 MG
1 TABLET ORAL
Qty: 0 | Refills: 0 | COMMUNITY

## 2018-09-11 RX ORDER — RISPERIDONE 4 MG/1
1 TABLET ORAL
Qty: 0 | Refills: 0 | COMMUNITY
Start: 2018-09-11

## 2018-09-11 RX ADMIN — HEPARIN SODIUM 5000 UNIT(S): 5000 INJECTION INTRAVENOUS; SUBCUTANEOUS at 05:59

## 2018-09-11 RX ADMIN — Medication 250 MILLIGRAM(S): at 05:59

## 2018-09-11 RX ADMIN — Medication 81 MILLIGRAM(S): at 12:47

## 2018-09-11 NOTE — DISCHARGE NOTE ADULT - CARE PROVIDER_API CALL
Jennifer Childers), Cardiovascular Disease; Internal Medicine  [address]  [address]  [place] [address]  Phone: [phone]  Fax: [phone]    Rory Cerda (MD; PhD), Neurology; Vascular Neurology  [address]  [address]  [place] [address]  Phone: [phone]  Fax: [phone]    primary care provider,   Phone: (   )    -  Fax: (   )    -

## 2018-09-11 NOTE — DISCHARGE NOTE ADULT - ADDITIONAL INSTRUCTIONS
please see primary MD and neurology in 1-2 week of discharge, bring all discharge paper and med list on every visit. please see primary MD,cardiology and neurology in 1-2 week of discharge, bring all discharge paper and med list on every visit.

## 2018-09-11 NOTE — DISCHARGE NOTE ADULT - HOSPITAL COURSE
The patient is a 89 year old female with a history of hypertension and hyperlipidemia who was brought to the ER for altered mental status and worsening confusion> Noted to have a fever and decreased oral intake. Similar presentation last year when admitted for a UTI. She is forgetful at baseline, but oriented and able to complete her ADLs. In the ED, noted to be febrile. Admitted for sepsis and hyponatremia likely secondary to UTI. Given a dose of vancomycin, started on IV rocephin CT head on admission negative.. Blood and urine cultures were negative. Changed to PO vantin to complete a 7 day course. Spoke with family, patient increasingly confused and disorganized. MRI of the brain neg for CVA>     Assessment/Plan:      1. inially Sepsis secondary to UTI: however Blood and urine cultures negative. CXR  neg. on PO vantin to complete 7 day course. Florastor  Sepsis now resolved    2. Hyponatremia and metabolic acidosis likely secondary to sepsis and hypovolemia Improved with iv hydration. Recheck electrolytes in 2-3 days    3. Acute toxic metabolic encephalopathy possibly to sepsis with underlying dementia , family denied any personal or family d/o dementia. ? pseudodementia in elderly: MR of the brain neg for acute events. psych eval called, appreciated. No need for antipsych at this time. No depresson. All medical w/u neg for confusion. Possible dementia. Needs dementia OP. neuro f/u OP in 1 week of DC.     4. Hyperlipidemia: On statin therapy    5. PVCs- Evaluated by cardiology, no episodes of afib on the monitor. Telemetry discontinued  Correct electrolytes.    6. Hypokalemia; normal K today. monitor electrolytes      VTE- heparin subcut .    PT evaluation- MELIZA when medically stable for discharge.PT eval appreciated    Evaluated at bedside. Denied complaints. Exam unremarkable except confusion which is likely due to dementia.     Plan of care discussed with patient and family. Return precautions discussed. Patient verbalized understanding    Time spent 35 min The patient is a 89 year old female with a history of hypertension and hyperlipidemia who was brought to the ER for altered mental status and worsening confusion> Noted to have a fever and decreased oral intake. Similar presentation last year when admitted for a UTI. She is forgetful at baseline, but oriented and able to complete her ADLs. In the ED, noted to be febrile. Admitted for sepsis and hyponatremia likely secondary to UTI. Given a dose of vancomycin, started on IV rocephin CT head on admission negative.. Blood and urine cultures were negative. Changed to PO vantin to complete a 7 day course. Spoke with family, patient increasingly confused and disorganized. MRI of the brain neg for CVA>     Assessment/Plan:      1. inially Sepsis secondary to UTI: however Blood and urine cultures negative. CXR  neg. on PO vantin to complete 7 day course. Florastor  Sepsis now resolved    2. Hyponatremia and metabolic acidosis likely secondary to sepsis and hypovolemia Improved with iv hydration. Recheck electrolytes in 2-3 days    3. Acute toxic metabolic encephalopathy possibly to sepsis with underlying dementia , family denied any personal or family d/o dementia. ? pseudodementia in elderly: MR of the brain neg for acute events. psych eval called, appreciated. No depresson. All medical w/u neg for confusion. Possible dementia. Needs dementia OP. neuro f/u OP in 1 week of DC.     4. Hyperlipidemia: On statin therapy    5. PVCs- Evaluated by cardiology, no episodes of afib on the monitor. Telemetry discontinued  Correct electrolytes.    6. Hypokalemia; normal K today. monitor electrolytes      VTE- heparin subcut .    PT evaluation- MELIZA when medically stable for discharge.PT eval appreciated    Evaluated at bedside. Denied complaints. Exam unremarkable except confusion which is likely due to dementia.     Plan of care discussed with patient and family. Return precautions discussed. Patient verbalized understanding    Time spent 35 min The patient is a 89 year old female with a history of hypertension and hyperlipidemia who was brought to the ER for altered mental status and worsening confusion> Noted to have a fever and decreased oral intake. Similar presentation last year when admitted for a UTI. She is forgetful at baseline, but oriented and able to complete her ADLs. In the ED, noted to be febrile. Admitted for sepsis and hyponatremia likely secondary to UTI. Given a dose of vancomycin, started on IV rocephin CT head on admission negative.. Blood and urine cultures were negative. Changed to PO vantin to complete a 7 day course. Spoke with family, patient increasingly confused and disorganized. MRI of the brain neg for CVA>     Assessment/Plan:      1. inially Sepsis secondary to UTI: however Blood and urine cultures negative. CXR  neg. on PO vantin to completed course for 7 days. Florastor  Sepsis now resolved    2. Hyponatremia and metabolic acidosis likely secondary to sepsis and hypovolemia Improved with iv hydration. Recheck electrolytes in 2-3 days    3. Acute toxic metabolic encephalopathy possibly to sepsis with underlying dementia , family denied any personal or family d/o dementia. ? pseudodementia in elderly: MR of the brain neg for acute events. psych eval called, appreciated. No depresson. All medical w/u neg for confusion. Possible dementia. Needs dementia OP. neuro f/u OP in 1 week of DC.     4. Hyperlipidemia: On statin therapy    5. PVCs- Evaluated by cardiology, no episodes of afib on the monitor. Telemetry discontinued  Correct electrolytes.    6. Hypokalemia; normal K today. monitor electrolytes      VTE- heparin subcut .    PT evaluation- MELIZA when medically stable for discharge.PT eval appreciated    Evaluated at bedside. Denied complaints. Exam unremarkable except confusion which is likely due to dementia.     Plan of care discussed with patient and family. Return precautions discussed. Patient verbalized understanding    Time spent 35 min

## 2018-09-11 NOTE — DISCHARGE NOTE ADULT - PROVIDER TOKENS
TOKEN:'[id number]',TOKEN:'[id number]',FREE:[LAST:[primary care provider],PHONE:[(   )    -],FAX:[(   )    -]]

## 2018-09-11 NOTE — DISCHARGE NOTE ADULT - MEDICATION SUMMARY - MEDICATIONS TO STOP TAKING
I will STOP taking the medications listed below when I get home from the hospital:  None I will STOP taking the medications listed below when I get home from the hospital:    metoprolol tartrate 25 mg oral tablet  -- 1 tab(s) by mouth 2 times a day   -- It is very important that you take or use this exactly as directed.  Do not skip doses or discontinue unless directed by your doctor.  May cause drowsiness.  Alcohol may intensify this effect.  Use care when operating dangerous machinery.  Some non-prescription drugs may aggravate your condition.  Read all labels carefully.  If a warning appears, check with your doctor before taking.  Take with food or milk.  This drug may impair the ability to drive or operate machinery.  Use care until you become familiar with its effects.

## 2018-09-11 NOTE — DISCHARGE NOTE ADULT - PLAN OF CARE
improved. prevent recurrence initially suspected to be due to UTI, treated with antibiotics, electrolytes checked and replaced. however BC and UC neg. electrolytes normalized. patient continues to have confusion. possible underdiagnosed dementia. needs dementia w/u by neurologist OP in 1-2 weeks after discharge. check electrolytes in 2-3 days. PCP f/u in 1 week of DC. return if symptoms recur. seen by psych. no anti-psych at this time. no psych contraindications for DC c/w meds. no AC due to fall risk. see cardiologist in 1-3 week of DC continue and finish antibiotics. drink plenty of water to keep hydrated initially suspected to be due to UTI, treated with antibiotics, electrolytes checked and replaced. however BC and UC neg. electrolytes normalized. patient continues to have confusion. possible underdiagnosed dementia. needs dementia w/u by neurologist OP in 1-2 weeks after discharge. check electrolytes in 2-3 days. PCP f/u in 1 week of DC. return if symptoms recur. seen by psych. started on risperidone.  no psych contraindications for DC currently sinus. c/w meds. no AC due to fall risk. see cardiologist in 2-3 week of DC completed treatment. drink plenty of water to keep hydrated currently sinus. off metoprolol as BP lower side and HR controlled without it. no AC due to fall risk. see cardiologist in 2-3 week of DC

## 2018-09-11 NOTE — DISCHARGE NOTE ADULT - CARE PLAN
Principal Discharge DX:	Delirium due to general medical condition  Goal:	improved. prevent recurrence  Assessment and plan of treatment:	initially suspected to be due to UTI, treated with antibiotics, electrolytes checked and replaced. however BC and UC neg. electrolytes normalized. patient continues to have confusion. possible underdiagnosed dementia. needs dementia w/u by neurologist OP in 1-2 weeks after discharge. check electrolytes in 2-3 days. PCP f/u in 1 week of DC. return if symptoms recur. seen by psych. no anti-psych at this time. no psych contraindications for DC  Secondary Diagnosis:	Chronic atrial fibrillation  Assessment and plan of treatment:	c/w meds. no AC due to fall risk. see cardiologist in 1-3 week of DC  Secondary Diagnosis:	Acute cystitis without hematuria  Assessment and plan of treatment:	continue and finish antibiotics. drink plenty of water to keep hydrated Principal Discharge DX:	Delirium due to general medical condition  Goal:	improved. prevent recurrence  Assessment and plan of treatment:	initially suspected to be due to UTI, treated with antibiotics, electrolytes checked and replaced. however BC and UC neg. electrolytes normalized. patient continues to have confusion. possible underdiagnosed dementia. needs dementia w/u by neurologist OP in 1-2 weeks after discharge. check electrolytes in 2-3 days. PCP f/u in 1 week of DC. return if symptoms recur. seen by psych. started on risperidone.  no psych contraindications for DC  Secondary Diagnosis:	Chronic atrial fibrillation  Assessment and plan of treatment:	currently sinus. c/w meds. no AC due to fall risk. see cardiologist in 2-3 week of DC  Secondary Diagnosis:	Acute cystitis without hematuria  Assessment and plan of treatment:	continue and finish antibiotics. drink plenty of water to keep hydrated Principal Discharge DX:	Delirium due to general medical condition  Goal:	improved. prevent recurrence  Assessment and plan of treatment:	initially suspected to be due to UTI, treated with antibiotics, electrolytes checked and replaced. however BC and UC neg. electrolytes normalized. patient continues to have confusion. possible underdiagnosed dementia. needs dementia w/u by neurologist OP in 1-2 weeks after discharge. check electrolytes in 2-3 days. PCP f/u in 1 week of DC. return if symptoms recur. seen by psych. started on risperidone.  no psych contraindications for DC  Secondary Diagnosis:	Chronic atrial fibrillation  Assessment and plan of treatment:	currently sinus. c/w meds. no AC due to fall risk. see cardiologist in 2-3 week of DC  Secondary Diagnosis:	Acute cystitis without hematuria  Assessment and plan of treatment:	completed treatment. drink plenty of water to keep hydrated Principal Discharge DX:	Delirium due to general medical condition  Goal:	improved. prevent recurrence  Assessment and plan of treatment:	initially suspected to be due to UTI, treated with antibiotics, electrolytes checked and replaced. however BC and UC neg. electrolytes normalized. patient continues to have confusion. possible underdiagnosed dementia. needs dementia w/u by neurologist OP in 1-2 weeks after discharge. check electrolytes in 2-3 days. PCP f/u in 1 week of DC. return if symptoms recur. seen by psych. started on risperidone.  no psych contraindications for DC  Secondary Diagnosis:	Chronic atrial fibrillation  Assessment and plan of treatment:	currently sinus. off metoprolol as BP lower side and HR controlled without it. no AC due to fall risk. see cardiologist in 2-3 week of DC  Secondary Diagnosis:	Acute cystitis without hematuria  Assessment and plan of treatment:	completed treatment. drink plenty of water to keep hydrated

## 2018-09-11 NOTE — DISCHARGE NOTE ADULT - MEDICATION SUMMARY - MEDICATIONS TO TAKE
I will START or STAY ON the medications listed below when I get home from the hospital:    acetaminophen 325 mg oral tablet  -- 2 tab(s) by mouth every 6 hours, As needed, Temp greater or equal to 38C (100.4F)  -- Indication: For pain    aspirin 81 mg oral tablet, chewable  -- 1 tab(s) by mouth once a day  -- Indication: For preventative    simvastatin 40 mg oral tablet  -- 1 tab(s) by mouth once a day (at bedtime)  -- Indication: For Cholesterol    risperiDONE 0.25 mg oral tablet  -- 1 tab(s) by mouth once a day (at bedtime)  -- Indication: For Confusion    metoprolol tartrate 25 mg oral tablet  -- 1 tab(s) by mouth 2 times a day   -- It is very important that you take or use this exactly as directed.  Do not skip doses or discontinue unless directed by your doctor.  May cause drowsiness.  Alcohol may intensify this effect.  Use care when operating dangerous machinery.  Some non-prescription drugs may aggravate your condition.  Read all labels carefully.  If a warning appears, check with your doctor before taking.  Take with food or milk.  This drug may impair the ability to drive or operate machinery.  Use care until you become familiar with its effects.    -- Indication: For Atrial fibrillation    saccharomyces boulardii lyo 250 mg oral capsule  -- 1 cap(s) by mouth 2 times a day  -- Indication: For probiotics    Ocuvite Eye + Multi oral tablet  -- 1 tab(s) by mouth once a day  -- Indication: For eye drop I will START or STAY ON the medications listed below when I get home from the hospital:    acetaminophen 325 mg oral tablet  -- 2 tab(s) by mouth every 6 hours, As needed, Temp greater or equal to 38C (100.4F)  -- Indication: For pain    aspirin 81 mg oral tablet, chewable  -- 1 tab(s) by mouth once a day  -- Indication: For preventative    simvastatin 40 mg oral tablet  -- 1 tab(s) by mouth once a day (at bedtime)  -- Indication: For Cholesterol    risperiDONE 0.25 mg oral tablet  -- 1 tab(s) by mouth once a day (at bedtime)  -- Indication: For Confusion    saccharomyces boulardii lyo 250 mg oral capsule  -- 1 cap(s) by mouth 2 times a day  -- Indication: For probiotics    Ocuvite Eye + Multi oral tablet  -- 1 tab(s) by mouth once a day  -- Indication: For eye drop

## 2018-09-11 NOTE — DISCHARGE NOTE ADULT - PATIENT PORTAL LINK FT
You can access the [facility] Patient Portal, offered by [facility], by registering with the following website: [email or web address]

## 2018-09-12 ENCOUNTER — EMERGENCY (EMERGENCY)
Facility: HOSPITAL | Age: 83
LOS: 1 days | Discharge: HOME | End: 2018-09-12
Attending: STUDENT IN AN ORGANIZED HEALTH CARE EDUCATION/TRAINING PROGRAM
Payer: MEDICARE

## 2018-09-12 VITALS
WEIGHT: 169.98 LBS | HEIGHT: 64 IN | TEMPERATURE: 98 F | RESPIRATION RATE: 16 BRPM | DIASTOLIC BLOOD PRESSURE: 54 MMHG | OXYGEN SATURATION: 96 % | SYSTOLIC BLOOD PRESSURE: 139 MMHG | HEART RATE: 87 BPM

## 2018-09-12 VITALS
HEART RATE: 72 BPM | OXYGEN SATURATION: 97 % | TEMPERATURE: 97 F | RESPIRATION RATE: 16 BRPM | SYSTOLIC BLOOD PRESSURE: 116 MMHG | DIASTOLIC BLOOD PRESSURE: 56 MMHG

## 2018-09-12 PROCEDURE — 73502 X-RAY EXAM HIP UNI 2-3 VIEWS: CPT | Mod: 26,RT

## 2018-09-12 PROCEDURE — 99284 EMERGENCY DEPT VISIT MOD MDM: CPT | Mod: 25

## 2018-09-12 PROCEDURE — 73502 X-RAY EXAM HIP UNI 2-3 VIEWS: CPT

## 2018-09-12 PROCEDURE — 99284 EMERGENCY DEPT VISIT MOD MDM: CPT

## 2018-09-12 PROCEDURE — 70450 CT HEAD/BRAIN W/O DYE: CPT | Mod: 26

## 2018-09-12 PROCEDURE — 70450 CT HEAD/BRAIN W/O DYE: CPT

## 2018-09-12 RX ORDER — ACETAMINOPHEN 500 MG
2 TABLET ORAL
Qty: 0 | Refills: 0 | COMMUNITY

## 2018-09-12 RX ORDER — RISPERIDONE 4 MG/1
1 TABLET ORAL
Qty: 0 | Refills: 0 | COMMUNITY

## 2018-09-12 RX ORDER — SACCHAROMYCES BOULARDII 250 MG
1 POWDER IN PACKET (EA) ORAL
Qty: 0 | Refills: 0 | COMMUNITY

## 2018-09-12 RX ORDER — SIMVASTATIN 20 MG/1
1 TABLET, FILM COATED ORAL
Qty: 0 | Refills: 0 | COMMUNITY

## 2018-09-12 RX ORDER — METOPROLOL TARTRATE 50 MG
1 TABLET ORAL
Qty: 0 | Refills: 0 | COMMUNITY

## 2018-09-12 RX ORDER — MULTIVIT-MIN/FERROUS GLUCONATE 9 MG/15 ML
1 LIQUID (ML) ORAL
Qty: 0 | Refills: 0 | COMMUNITY

## 2018-09-12 RX ORDER — ASPIRIN/CALCIUM CARB/MAGNESIUM 324 MG
1 TABLET ORAL
Qty: 0 | Refills: 0 | COMMUNITY

## 2018-09-12 NOTE — ED ADULT NURSE NOTE - NSIMPLEMENTINTERV_GEN_ALL_ED
Implemented All Fall with Harm Risk Interventions:  [place] to call system. Call bell, personal items and telephone within reach. Instruct patient to call for assistance. Room bathroom lighting operational. Non-slip footwear when patient is off stretcher. Physically safe environment: no spills, clutter or unnecessary equipment. Stretcher in lowest position, wheels locked, appropriate side rails in place. Provide visual cue, wrist band, yellow gown, etc. Monitor gait and stability. Monitor for mental status changes and reorient to person, place, and time. Review medications for side effects contributing to fall risk. Reinforce activity limits and safety measures with patient and family. Provide visual clues: red socks.

## 2018-09-12 NOTE — ED ADULT NURSE NOTE - OBJECTIVE STATEMENT
Pt. brought to ED for unwitnessed fall in rehab. Pt. stated she was walking to the bathroom when her diaper fell down, pt. reported bending over to pull up diaper and fell to floor. Pt. denied hitting head or loss of consciousness. Pt. denied pain. Ecchymosis noted to bilateral upper extremities and back.

## 2018-09-12 NOTE — ED PROVIDER NOTE - PHYSICAL EXAMINATION
Head: NC/AT, full ROM of c-spine, no T/L/S spine tenderness or deformity.  Full ROM of b/l hips and pelvis.  No RLE leg shortening or rotation.  No ecchymosis or tenderness to right hip, no LE edema, cap refill < 2 seconds

## 2018-09-12 NOTE — ED ADULT NURSE REASSESSMENT NOTE - NS ED NURSE REASSESS COMMENT FT1
Patient lying comfortably in bed waiting for CT scan and x-ray result.
Pt. awaiting transportation at this time.

## 2018-09-12 NOTE — ED PROVIDER NOTE - CONSTITUTIONAL, MLM
normal... Well appearing, well nourished, awake, alert, oriented to person, place only and in no apparent distress.

## 2018-09-12 NOTE — ED PROVIDER NOTE - CHPI ED SYMPTOMS NEG
no loss of consciousness/no numbness/no vomiting/no deformity/no abrasion/no bleeding/no fever/no tingling/no weakness

## 2018-09-12 NOTE — ED PROVIDER NOTE - OBJECTIVE STATEMENT
89 year old  female with a history of dementia, a-fib, HLD, AMS, UTI presents with mechanical fall at nursing home.  Fall was witnessed by patient's roommate.  Patient was coming out of the bathroom and slipped on her pants.  She fell on the floor, denies LOC or head trauma.  Takes ASA daily.  Patient denies any pain, but is unreliable historian.  NH staff noted that patient was favoring her right hip.  Patient was d/c'd yesterday from [place] for UTI/delirium.  PMD Dr. Medina

## 2018-09-12 NOTE — ED ADULT NURSE NOTE - PMH
Acute cystitis with hematuria    Atrial fibrillation    Hyperlipidemia    Muscle weakness (generalized)    Pain, unspecified    Sepsis no known mental health issues.

## 2018-09-12 NOTE — ED PROVIDER NOTE - PMH
Acute cystitis with hematuria    Atrial fibrillation    Hyperlipidemia    Muscle weakness (generalized)    Pain, unspecified    Sepsis

## 2019-06-03 NOTE — ED PROVIDER NOTE - PR
PRE-SEDATION ASSESSMENT    CONSENT  Consent for procedure and sedation obtained: Yes    MEDICAL HISTORY  Significant medical/surgical history: Yes  Past Complications with Sedation/Anesthesia: No  Significant Family History: No  Smoking History: No  Alcohol/Drug abuse: No  Possible Pregnancy (LMP): No  Cardiac History: No  Respiratory History: No    PHYSICAL EXAM  History and Physical Reviewed: H&P completed today  Airway Risk History: No previous complications  Airway Anatomy : Class II  Heart : Normal  Lungs : Normal  LOC/Mental Status : Normal    OTHER FINDINGS  Reviewed current medications and allergies: Yes  Pertinent lab/diagnostic test reviewed: Yes    SEDATION RISK ASSESSMENT  Risk Status ASA: Class II - Normal patient with mild systemic disease  Plan for Sedation: Moderate Sedation  Indications for Procedure/Pre-Procedure Diagnosis and Planned Procedure: H/o UC  EKG Monitoring: Yes    NARRATIVE FINDINGS     
176 ms

## 2019-09-26 NOTE — ED PROVIDER NOTE - DISCUSSED CLINICAL AND RADIOLOGICAL FINDINGS WITH, MDM
EGD INSTRUCTIONS       Re: Kerrie Hoffman     It has been decided after careful medical assessment that EGD (esophagogastroduodenoscopy) is necessary for further evaluation and treatment of your condition.     IF YOU DO  NOT HAVE AN ADULT TO DRIVE YOU HOME, YOUR SURGERY WILL BE CANCELLED.     Your exam is scheduled on:     Day: Thursday    Date: OCTOBER 17 2019    Arrival Time: 9:30 AM    Location: [facility], [address] [place] [id number] - Directions: Come all the way into the main lobby of the building and take the interior elevator down to the lower level. Turn left off the elevator and walk straight ahead to the Endoscopy reception area.               7 days before EGD: Review your upper endoscopy instructions. (Instructions can also be found on [facility])  • Arrange a ride: you will be given medicine that makes you relax and sleepy, so you cannot drive a car or take a bus/taxi home. If you arrive without an 18 year or older escort, your procedure will need to be rescheduled. Your  MUST stay with you for the duration of the procedure.  • Stop eating popcorn, nuts, flax/sesame seeds, or any food that contains seeds              3  BUSINESS DAYS BEFORE your procedure:  • Confirm your ride. Your  MUST stay with you for the duration of the procedure.  • If you need to cancel your appointment, call your doctor or nurse at [phone] to avoid a cancellation fee.  • Stop taking all anti-inflammatory medicines. These include: Advil, Aleve, Naprosyn, (Tylenol is okay to take)    Night before the exam:    1. Do not eat after midnight you may have clear liquids until  5:30 AM  2. Clear liquids include chicken or beef broth or bouillon, coffee without cream, tea, popsicles, clear Gatorade or lemonade, hard candy, water, clear fruit juice such as apple or white grape, any carbonated or non-carbonated clear soft drink and flavored jello. NO MILK OR MILK PRODUCTS, NO RED  JELLO OR JUICE.            MEDICATIONS     1. If you are diabetic: Not applicable  2. Please take the following medication the day of the procedure: none   3. Tylenol can be used as needed.     Please call our office if you have been prescribed any new medication between now and your procedure.       PROCEDURE INSTRUCTIONS     Bring photo ID, current insurance card, inhalers or nebulizers and an updated list of your current medications with you to your procedure. You will be at the [facility] or [facility] approximately 2-3 hours. Do not wear any jewelry, contact lenses, or bring any valuables. A pregnancy test is required if you are female and under the age of 56. Be aware that there is a chance that your procedure time may be changed.    Your provider may not have an opportunity to meet with you after your procedure. You can expect a letter or a telephone call within one week regarding the results of your exam.    If you have questions regarding procedure date or instructions, please call the doctor's office at [phone].    If your insurance requires a co-payment, please be prepared with payment at time of service.          Pre-Operative Instructions    Patient name: Kerrie Hoffman         We have scheduled you today for a GI procedure that will be performed sometime within the next few months. Because we realize that there may be some changes in the maintenance of your health after today, but prior to your procedure, we ask that you note the followin. If you have any additional medications that are added to the current medications you are taking, please notify our office so that we can properly instruct you in how to take this around the time of your procedure. For example,  if you started on any type of blood thinner, any type of anti inflammatory medication, or any type of iron supplement or vitamin, these medications will need to be stopped, depending on what they are, several days  BEFORE the procedure. If you are given pain medications or any new type of heart medication or blood pressure pills or are a newly diagnoses diabetic put on blood sugar medication, we may also need to make adjustments regarding these.  2. If you have any type of device implanted (pacemaker, defibrillator, implanted pain device or stimulator, or have any kind of joint replacement) please let us know, as special arrangements may need to be made in order for the procedure to be performed.  3. If you develop new allergies, such as Latex, Demerol or Versed, special arrangements may need to be made.  4. We will also need to be made aware of any changes in your insurance as we want to be sure that you receive the full benefit of your plan.      We understand that situations may arise causing you to cancel and/or reschedule your procedure date. We would appreciate at least two weeks notice. Thank you for your cooperation.    The staff of the [facility] offers these suggestions if you are scheduled for surgery.    1. Plan for unforeseen changes in surgery time. Although we try to maintain a set surgery schedule, there are times when a surgery case may take longer than expected. This may result in your being in the surgery center longer than originally planned.      2. Arrange for an adult to stay with you at the surgery center. It is very important that you have an adult stay with you at the surgery center during your procedure. The medications you receive can make you sleepy and forgetful. For this reason, the doctor may want to discuss your surgery and post-operative care with an adult friend or family member. Additionally, an adult will be needed to drive you home. An adult must stay with you for the first 24 hours after your surgery. IF YOU DO  NOT HAVE AN ADULT TO DRIVE YOU HOME, YOUR SURGERY WILL BE CANCELLED.     3. Remember to follow pre-operative dietary restrictions. An empty stomach is  imperative to prevent nausea or vomiting during and after your procedure.      4. Things to bring with you:  · a list of your current medications (including \"over the counter\" and herbal medications)  · important legal documents such as Power of , Guardianship papers  · any assistive device you are currently using (crutches, walker, hearing aid, etc.)    5. Limit visitors while you are at the surgery center. The time spent at the surgery center is very brief and will be limited to preparing you for surgery and assisting you to recover afterwards. Surgery center rooms are very small, which requires us to limit the number of visitors allowed in at one time.      6. Avoid alcoholic beverages. Because you will be asked to \"fast\" before your procedure, your body will be in a naturally dehydrated state. Alcohol will add to this dehydration making you more prone to problems with blood pressure during and after your procedure You should avoid alcoholic beverages for at least 24 hours prior to your surgery.    7. Wear loose comfortable clothes. Clothes that are easy to put on and take off are best. Sweat pants, shirts with buttons, and slip-on shoes are wise choices. Avoid tight-fitting clothing such as blue jeans and turtlenecks.    8. Patients on Anticoagulation Medications:   IF YOU HAVE NOT RECEIVED INSTRUCTIONS REGARDING YOUR BLOOD THINNING MEDICATIONS WITHIN 7 DAYS OF YOUR SURGERY, PLEASE CALL THE GI DOCTOR'S OFFICE. FAILURE TO DO SO MAY RESULT IN CANCELLATION OF YOUR SURGERY.     9. Ask questions and insist on answers. Surgery can be a stressful time for anyone. If you have any questions or are unsure about any information given to you, be sure to ask for clarification. A patient can never ask too many questions. If there is something on your mind, let us know. We always want you to feel safe and confident in the care you are receiving.         TO ALL [facility] AM[facility]AT[facility] SURGERY PATIENTS    You  can decide today about the care you will receive in the future. [facility] respects your rights and will support your decisions to the fullest extent permitted by law, including your right to refuse or accept medical or surgical treatment. Please be advised that the [facility] prohibits ambulatory surgery centers from upholding Advanced Directives during surgical procedures. For this reason, any Advanced Directive will be null and void during your stay in the [facility].     Although not honored in the [facility], you are still entitled to be informed about your rights surrounding Advanced Directives. Advanced Directives are legal documents that enable you to specify what forms of treatment you want performed or withheld should you become unable to make or communicate these decisions on your own.  Although this is not a common decision made by outpatient surgery patients, we would like to let you know that an information booklet, \"A Personal Decision\" is available upon your request.      How do you know if an Advanced Directive is right for you?  It is important to realize your rights as an individual, what the nature of consent for treatment implies, what a durable power of  for health care is and what a living will is.      After reviewing the booklet, \"A Personal Decision,\" should you have any further questions, please call us at [phone].      Thank you.     PATIENT’S RIGHTS    I. To be treated with respect, consideration and dignity, free from all forms of abuse, harassment and discrimination.     II. To receive quality care and high professional standards in a safe environment.    II. To be provided with appropriate privacy.    III. To expect that all disclosures and records are treated confidentially and, except when required by law, to be given the opportunity to approve or  refuse their release.    IV. To be provided, to the degree known, complete information concerning their diagnosis, treatment and prognosis. When appropriate, the information is provided to a person designated by the patient to be a legally authorized person.    V. To review the records pertaining to his/her medical care and to have the information explained or interpreted as necessary except when restricted law.    VI. To expect that we will communicate with you in a matter that you can understand.     VII. To be given the names of all practitioners and health care personnel participating in his/her care.    VIII. To make decisions about the plan of care prior to and during the course of treatment.  IX. To refuse a recommended treatment or plan of care to the extent permitted by law and to be informed of the medical consequences of this action.     X. To expect that your treatment preferences will be responded to as delineated in your Advanced Directive, within the limits of the ASC bylaws regarding resuscitation    XI. To be informed as to:  A. Rights and Responsibilities.  B. Services available in the organization.  C. Provisions for after-hours and emergency care.  D.  The charges for services and available payment options.   E. The right to consent or decline participation in proposed research  studies.  F.  The available resources for resolving disputes, grievances, and conflicts.      XII. To expect emergency procedures to be implemented without unnecessary delay.    XIII. To expect a safe hospital transfer with appropriate medical records when necessary.     XIV. To know that all patients rights extend to the patient’s legal representatives.     XV. Complaints regarding Patients Rights or any issue surrounding care received during your stay can be made by contacting any of the following organizations:  i. Medicare patient: Visit the Office of Medicare Beneficiary Klaudia at www.medicare.gov on the web.  Or  call 1-800-Medicare (1-145.387.7449).     [employer] users should call 1-430.833.1052.  ii. Non-Medicare patients can contact the [place] Department of [facility] at [phone]; fax [phone], TTY [phone].  iii. Any patient can also contact the [employer] at [phone] (toll free 8:30 am to 5:00 pm, central time, weekdays).        Patient Responsibilities    It is your responsibility as a [facility] patient:    · To provide all personal and family health information needed to provide you with appropriate care.    · To participate to the best of your ability in making decisions about your medical treatment, and to comply with the agreed upon plan of care.    · To ask questions of your physician or other care providers when you do not understand any information or instructions.    · To maintain appointments as scheduled, or to reschedule in a timely fashion.    · To inform your physician and other care providers if you anticipate problems in following prescribed treatment.    · To recognize the impact of your lifestyle on your personal health.    · To inform your physician or other care provider if you desire a transfer of care to another physician.    · To be considerate of others receiving and providing care.  To observe relevant surgery center policies and procedures.    · To accept financial responsibility for health care services and to work cooperatively with [facility] to resolve financial obligations       patient

## 2020-03-05 NOTE — H&P ADULT - NSHPREVIEWOFSYSTEMS_GEN_ALL_CORE
Total time: 40 minutes  Counseling time: 25 minutes    Hawk return today for follow-up visit.  In the intervening time since our last visit, he has been doing fairly well.  He is looking forward to his last trimester of high school.  He is facing graduation from high school with a little bit of bittersweet feelings.    He brought up his sister Anabella's recent struggles with depression.  She is currently engaged in weekly therapy and seems to be doing somewhat better.  He has a close friend, Chelsey, who also has depression.  She and he were close at his children.  He is navigating questions about boundaries with regard to the friendship and romantic feelings.  He is also wondering about the 2 of them moving in together after he graduates from high school.    When he brought up the idea of moving out when he starts at [employer] for his general credits, his mother was overcome, and he said she was tearful for a long time.    Lakhwinder speaks with pretty sophisticated language about emotions and emotional regulation and depression.  He spoke about his own struggles with depression and suicidal ideation in early high school.  He seems to have a strong empathetic and compassionate connection to both his sister and to his friend.      Provided substantial guidance, education, and counseling with regard to navigating the challenging issues of boundaries and loyalty in these kinds of relationships.  Also provided guidance with regard to the limits of responsibility and the need for healthcare in these kinds of situations as well.  Topic for our next visit will be this issue of separation from his mom and moving out for college and considering moving in with his friend who is still actively struggling with intermittent relapses of her depression.    ASSESSMENT:   1. ADHD, combined type.   2. Anxiety, periodic compulsions and obsessions; in remission.   3. Constipation; in remission.   4. Nocturnal enuresis, persists.  5.  Special education with current services under an IEP.  6. Acute sibling mental health concerns.         RECOMMENDATIONS:   1. Diagnostic:  No changes at this time.  2. Counseling and Education:  Substantial guidance, education and counseling today with regard to my interpretation and therapeutic recommendations as described above.   3. Diet:  No changes.   4. Sleep:  No changes.  5. Self-monitoring: No changes.   6. Self-regulation:  No changes.  7. Behavior modification: Continue with strategies. After school plan.  8. Medication:  Continues trial off fluoxetine and guanfacine  9. Follow-up: Monthly. Alternating parent and child-directed visits.    Patient non-verbal, ROS obtained from family

## 2020-12-08 NOTE — PATIENT PROFILE ADULT. - MEDICATIONS BROUGHT TO HOSPITAL, PROFILE
[FreeTextEntry1] : URI Possible COVID. Symptomatic treatment. Nasopharyngeal swab pending. Followup if not better over the next few days. Sooner if worse. no

## 2022-06-13 NOTE — ED ADULT NURSE NOTE - RN DISCHARGE SIGNATURE
12-Sep-2018 Bexarotene Counseling:  I discussed with the patient the risks of bexarotene including but not limited to hair loss, dry lips/skin/eyes, liver abnormalities, hyperlipidemia, pancreatitis, depression/suicidal ideation, photosensitivity, drug rash/allergic reactions, hypothyroidism, anemia, leukopenia, infection, cataracts, and teratogenicity.  Patient understands that they will need regular blood tests to check lipid profile, liver function tests, white blood cell count, thyroid function tests and pregnancy test if applicable.

## 2022-12-12 NOTE — ED ADULT NURSE NOTE - NSFALLRSKHRMRISKTYPE_ED_ALL_ED
EMERGENCY DEPARTMENT ENCOUNTER      NAME: Apurva Palafox  AGE: 4 year old female  YOB: 2018  MRN: [id number]  EVALUATION DATE & TIME: 12/12/2022  5:07 AM    PCP: Erica [facility]    ED PROVIDER: Jeramy Quintanilla M.D.      Chief Complaint   Patient presents with     Vomiting     Fever         FINAL IMPRESSION:  1. Influenza A    2. Nausea and vomiting, unspecified vomiting type          ED COURSE & MEDICAL DECISION MAKING:    Pertinent Labs & Imaging studies reviewed. (See chart for details)  4 year old female presents to the Emergency Department for evaluation of fever, vomiting. Patient appears non toxic with stable vitals signs, patient is febrile with slight tachycardia, no hypoxia or increased work of breathing.  Lungs are clear, abdomen is completely benign with no focal tenderness, no guarding.  Prior to my evaluation patient had received Zofran and at bedside now patient is eating ariel crackers, drinking water and appears quite well and comfortable.  With benign abdominal exam and p.o. tolerance, certainly nothing to suggest obstruction, perforation, GI bleed, no focal tenderness to suggest cholecystitis, appendicitis, pancreatitis or diverticulitis.  Lungs clear with nothing to suggest pneumonia and COVID/influenza/RSV test had been performed and influenza A positive which does fit the patient's overall clinical presentation.  Patient was given dose of ibuprofen here as she threw up her dose at home.  On repeat evaluation patient appears quite well and comfortable, fever resolved, patient tolerating p.o. food and fluids.  Again patient is up-to-date with immunizations per mother's report with no chronic illness and certainly nothing to suggest meningitis, sepsis, otitis, pharyngitis, nothing to suggest intracranial bleed or mass.  Will recommend conservative management with children's Tylenol ibuprofen, plenty of fluids and close follow-up with primary care in the next  5 to 7 days.  Given otherwise benign exam and no significant chronic risk factors no indication for Tamiflu.  Discussed these findings recommendations with patient and family who felt reassured and comfortable discharge.  Return precautions provided.          Medical Decision Making    Supplemental history from: Family Member/Significant Other    External Record(s) Reviewed: Documented in HPI, if applicable.    Differential Diagnosis: See [facility] charting for differential considered.     I performed an independent interpretation of the: N/A    Discussed with radiology regarding test interpretation: N/A    Discussion of management with another provider: See chart documentation, if applicable    The following testing was considered but ultimately not selected: None     I considered prescription management with: Symptomatic Management    The patient's care impacted: None    Consideration of Admission/Observation: N/A - Patient discharged without consideration for admission    Care significantly affected by Social Determinants of Health including: N/A      6:25 AM I met with the patient to gather history and to perform my initial exam. We discussed plans for the ED course, including diagnostic testing and treatment. PPE: N95 mask, gloves, and goggles  7:52 AM Patient's fever has resolved and her repeat exam is benign. We discussed the plan for discharge and the patient and her mother are agreeable. Reviewed supportive cares, symptomatic treatment, outpatient follow up, and reasons to return to the Emergency Department. Patient to be discharged by ED RN.   At the conclusion of the encounter I discussed the results of all of the tests and the disposition. The questions were answered and return precautions provided. The patient or family acknowledged understanding and was agreeable with the care plan.         MEDICATIONS GIVEN IN THE EMERGENCY:  Medications   ondansetron (ZOFRAN ODT) ODT tab 4 mg (4 mg Oral Given 12/12/22  0523)   ibuprofen (ADVIL/MOTRIN) suspension 200 mg (200 mg Oral Given 12/12/22 0646)       NEW PRESCRIPTIONS STARTED AT TODAY'S ER VISIT  Discharge Medication List as of 12/12/2022  7:57 AM      START taking these medications    Details   !! ondansetron (ZOFRAN) 4 MG/5ML solution Take 5 mLs (4 mg) by mouth 2 times daily as needed for nausea or vomiting, Disp-50 mL, R-0, Local Print       !! - Potential duplicate medications found. Please discuss with provider.               =================================================================    HPI    Patient information was obtained from: patient's mother    Use of : N/A         Apurva Palafox is a 4 year old female who presents for fever and vomiting.    Patient's mother reports that last night (12/11/22) the patient could not sleep because she was shivering. Around 3 AM today, the patient woke her mother up and said she needed to go to the bathroom. She then vomited and her mother noted she had a fever. Per triage note, the fever was 103 F. Her mother then attempted to give her ibuprofen but the patient then vomited again. Patient has vomited about 5 times total. Patient also had a cough last night so her mother gave her OTC cough medication. She denies any rash or changes to her urinary or stool habits. Patient was otherwise okay this past weekend. Her mother was sick for about a week 2 weeks ago but has recovered. Patient is otherwise healthy, up to date on her immunizations, and does not take any daily medications. Denies any history of diabetes, asthma, or heart or lung disease. No other complaints or concerns expressed at this time.       REVIEW OF SYSTEMS   Constitutional:  Endorses fever and chills.   Respiratory: Endorses cough. Denies increased work of breathing  Cardiovascular:  Denies palpitations  GI: Endorses vomiting (x5). Denies abdominal pain, or change in bowel or bladder habits   Musculoskeletal:  Denies any new muscle/joint  swelling  Skin:  Denies rash   Neurologic:  Denies lethargy   All systems negative except as marked.     PAST MEDICAL HISTORY:  History reviewed. No pertinent past medical history.    PAST SURGICAL HISTORY:  History reviewed. No pertinent surgical history.      CURRENT MEDICATIONS:    Prior to Admission medications    Medication Sig Start Date End Date Taking? Authorizing Provider   acetaminophen (TYLENOL) 32 mg/mL liquid Take 7.5 mLs (240 mg) by mouth every 6 hours as needed for fever 9/30/21   Jocelin Gannon PA-C   acetaminophen (TYLENOL) 32 mg/mL liquid Take 7.5 mLs (240 mg) by mouth every 6 hours as needed for fever or mild pain 9/10/21   Crispin Spangler MD   ibuprofen (ADVIL/MOTRIN) 100 MG/5ML suspension Take 8 mLs (160 mg) by mouth every 8 hours as needed for fever 9/30/21   Jocelin Gannon PA-C   ondansetron (ZOFRAN) 4 MG/5ML solution Take 2.5 mLs (2 mg) by mouth 2 times daily as needed for nausea or vomiting 9/30/21   Jocelin Gannon PA-C   ondansetron (ZOFRAN) 4 MG/5ML solution Take 2.5 mLs (2 mg) by mouth 3 times daily as needed for nausea or vomiting 9/10/21   Crispin Spangler MD        ALLERGIES:  No Known Allergies    FAMILY HISTORY:  History reviewed. No pertinent family history.    SOCIAL HISTORY:   Social History     Socioeconomic History     Marital status: Single       VITALS:  Pulse 145   Temp 98.8  F (37.1  C) (Oral)   Resp 20   Wt 22 kg (48 lb 8 oz)   SpO2 100%        PHYSICAL EXAM    Constitutional: Well developed, well nourished. NAD. Age appropriate appearance.  Eyes:  PERRL, EOMI, conjunctiva normal   HENT:   Normal posterior oropharynx. TM's are pink, non-bulging with no erythema bilaterally. Neck supple. No cervical lymphadenopathy.    Respiratory:  Lungs CTAB. No wheezes, rales, rhonchi or cough. No retractions or flare.  Cardiovascular:  RRR, S1S2, no murmurs, rubs, or gallops. Good distal pulses.  GI: Symmetrical, soft, non-distended, non-tender, no masses or  organomegaly.  Musculoskeletal:  Moves all extremities spontaneously, No obvious deformities, full ROM.  Skin:  No pallor or cyanosis.  No rashes or lesions noted.  Normal turgor and cap refill.  Neuro: Alert. Strength and sensation symmetric.  Psych:  Age appropriate interactions    LAB:  All pertinent labs reviewed and interpreted.  Results for orders placed or performed during the hospital encounter of 12/12/22   Symptomatic Influenza A/B & SARS-CoV2 (COVID-19) Virus PCR Multiplex Nose    Specimen: Nose; Swab   Result Value Ref Range    Influenza A PCR Positive (A) Negative    Influenza B PCR Negative Negative    RSV PCR Negative Negative    SARS CoV2 PCR Negative Negative       RADIOLOGY:  No orders to display          EKG:      I have independently reviewed and interpreted the EKG(s) documented above.    PROCEDURES:          I, Heather Dumont, am serving as a scribe to document services personally performed by Jeramy Quintanilla MD, based on my observation and the provider's statements to me. I, Jeramy Quintanilla MD attest that Heather Dumont is acting in a scribe capacity, has observed my performance of the services and has documented them in accordance with my direction.    Jeramy Quintanilla M.D.  Emergency Medicine  [facility] EMERGENCY ROOM  [address] [address] [phone]  Dept: [phone]     Jeramy Quintanilla MD  12/12/22 9932     age(85 years old or older)

## 2022-12-15 NOTE — PROGRESS NOTE ADULT - ASSESSMENT
The patient is a 89 year old female with a history of hypertension and hyperlipidemia who was brought to the ER for altered mental status and worsening confusion> Noted to have a fever and decreased oral intake. Similar presentation last year when admitted for a UTI. She is forgetful at baseline, but oriented and able to complete her ADLs. In the ED, noted to be febrile. Admitted for sepsis and hyponatremia likely secondary to UTI. Given a dose of vancomycin, started on IV rocephin CT head on admission negative.. Blood and urine cultures were negative. Changed to PO vantin to complete a 7 day course. Spoke with family, patient increasingly confused and disorganized. MRI of the brain ordered to rule out CVA>     Assessment/Plan:      1. Sepsis secondary to UTI: Blood and urine cultures negative. on PO vantin to complete 7 day course. Florastor  Sepsis now resolved    2. Hyponatremia and metabolic acidosis likely secondary to sepsis and hypovolemia Improved with iv hydration. MOnitor bmp     3. Acute toxic metabolic encephalopathy possibly to sepsis with underlying dementia: Posey in place for safety, MR of the brain neg for acute events. psych eval     4. Hyperlipidemia: On statin therapy    5. PVCs- Evaluated by cardiology, no episodes of afib on the monitor. Telemetry discontinued  Correct electrolytes    6. Hypokalemia; normal K today. monitor electrolytes      VTE- heparin subcut .    PT evaluation- MELIZA when medically stable for discharge Yes

## 2023-03-29 NOTE — PHYSICAL THERAPY INITIAL EVALUATION ADULT - LEVEL OF INDEPENDENCE, REHAB EVAL
Last 3 Encounters:   03/29/23 (!) 148/90   01/12/23 (!) 156/98   09/14/22 136/86          Results for orders placed or performed in visit on 03/29/23   Microalbumin / Creatinine Urine Ratio   Result Value Ref Range    Microalbumin, Random Urine 11.00 (H) <2.0 mg/dL    Creatinine, Ur 142.7 39.0 - 259.0 mg/dL    Microalbumin Creatinine Ratio 77.1 (H) 0.0 - 30.0 mg/g     The 10-year ASCVD risk score (Jef ESPINOZA, et al., 2019) is: 18%    Values used to calculate the score:      Age: 46 years      Sex: Male      Is Non- : Yes      Diabetic: No      Tobacco smoker: Yes      Systolic Blood Pressure: 773 mmHg      Is BP treated: Yes      HDL Cholesterol: 56 mg/dL      Total Cholesterol: 136 mg/dL  Lab Review   No visits with results within 2 Month(s) from this visit. Latest known visit with results is:   Office Visit on 01/12/2023   Component Date Value    Microalbumin, Random Uri* 01/12/2023 3.60 (A)     Creatinine, Ur 01/12/2023 72.5     Microalbumin Creatinine * 01/12/2023 49.7 (A)            Assessment:       Diagnosis Orders   1. Essential hypertension  Microalbumin / Creatinine Urine Ratio    amLODIPine-benazepril (LOTREL) 10-20 MG per capsule      2. Weight gain                Plan:      Blood pressure not controlled with amlodipine 10 mg. Switching him to Lotrel. Recheck in 3 weeks. Adding on ACE inhibitor to address the microalbumin      Return in about 3 weeks (around 4/19/2023) for HTN, needs BMP. modified I

## 2023-04-05 ENCOUNTER — EMERGENCY (EMERGENCY)
Facility: HOSPITAL | Age: 88
LOS: 0 days | Discharge: HOME | End: 2023-04-05
Attending: STUDENT IN AN ORGANIZED HEALTH CARE EDUCATION/TRAINING PROGRAM
Payer: MEDICARE

## 2023-04-05 VITALS
HEART RATE: 99 BPM | TEMPERATURE: 98 F | OXYGEN SATURATION: 98 % | SYSTOLIC BLOOD PRESSURE: 150 MMHG | RESPIRATION RATE: 18 BRPM | DIASTOLIC BLOOD PRESSURE: 97 MMHG

## 2023-04-05 VITALS
DIASTOLIC BLOOD PRESSURE: 98 MMHG | HEART RATE: 125 BPM | OXYGEN SATURATION: 100 % | TEMPERATURE: 98 F | SYSTOLIC BLOOD PRESSURE: 149 MMHG | RESPIRATION RATE: 22 BRPM | WEIGHT: 149.91 LBS

## 2023-04-05 DIAGNOSIS — S09.90XA UNSPECIFIED INJURY OF HEAD, INITIAL ENCOUNTER: ICD-10-CM

## 2023-04-05 DIAGNOSIS — I48.91 UNSPECIFIED ATRIAL FIBRILLATION: ICD-10-CM
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
## 2023-04-05 DIAGNOSIS — E78.00 PURE HYPERCHOLESTEROLEMIA, UNSPECIFIED: ICD-10-CM

## 2023-04-05 DIAGNOSIS — Z79.82 LONG TERM (CURRENT) USE OF ASPIRIN: ICD-10-CM
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
## 2023-04-05 DIAGNOSIS — F03.90 UNSPECIFIED DEMENTIA, UNSPECIFIED SEVERITY, WITHOUT BEHAVIORAL DISTURBANCE, PSYCHOTIC DISTURBANCE, MOOD DISTURBANCE, AND ANXIETY: ICD-10-CM

## 2023-04-05 PROCEDURE — 99285 EMERGENCY DEPT VISIT HI MDM: CPT | Mod: 25

## 2023-04-05 PROCEDURE — 99284 EMERGENCY DEPT VISIT MOD MDM: CPT

## 2023-04-05 PROCEDURE — 72125 CT NECK SPINE W/O DYE: CPT | Mod: 26,MA

## 2023-04-05 PROCEDURE — 70450 CT HEAD/BRAIN W/O DYE: CPT | Mod: MA

## 2023-04-05 PROCEDURE — 70450 CT HEAD/BRAIN W/O DYE: CPT | Mod: 26,MA

## 2023-04-05 PROCEDURE — 72125 CT NECK SPINE W/O DYE: CPT | Mod: MA

## 2023-04-05 NOTE — ED PROVIDER NOTE - NSICDXPASTMEDICALHX_GEN_ALL_CORE_FT
PAST MEDICAL HISTORY:  Acute cystitis with hematuria     Atrial fibrillation     High cholesterol     Hyperlipidemia     Muscle weakness (generalized)     Pain, unspecified     Sepsis

## 2023-04-05 NOTE — ED PROVIDER NOTE - PATIENT PORTAL LINK FT
You can access the FollowMyHealth Patient Portal offered by [facility] by registering at the following website: [email or web address]. By joining [facility] FollowMyHealth portal, you will also be able to view your health information using other applications (apps) compatible with our system.

## 2023-04-05 NOTE — ED ADULT NURSE NOTE - OBJECTIVE STATEMENT
Pt. is a 89YOF presenting to ED s/p witnessed mechanical fall out of wheelchair. Hit back of head, laceration to back of head, denies LOC. No AC. Pt. does not remember falling

## 2023-04-05 NOTE — ED ADULT TRIAGE NOTE - CHIEF COMPLAINT QUOTE
Pt arrives to ED s/p witnessed mechanical fall out of wheelchair. Hit back of head, laceration to back of head, denies LOC. No daily blood thinners. GCS 15. no fever and no chills.

## 2023-04-05 NOTE — ED PROVIDER NOTE - NSFOLLOWUPINSTRUCTIONS_ED_ALL_ED_FT
Fall Prevention    WHAT YOU NEED TO KNOW:    Fall prevention includes ways to make your home and other areas safer. It also includes ways you can move more carefully to prevent a fall. Health conditions that cause changes in your blood pressure, vision, or muscle strength and coordination may increase your risk for falls. Medicines may also increase your risk for falls if they make you dizzy, weak, or sleepy.    DISCHARGE INSTRUCTIONS:    Call 911 or have someone else call if:  You have fallen and are unconscious.  You have fallen and cannot move part of your body.  Contact your healthcare provider if:  You have fallen and have pain or a headache.  You have questions or concerns about your condition or care.  Fall prevention tips:  Stand or sit up slowly. This may help you keep your balance and prevent falls.  Use assistive devices as directed. Your healthcare provider may suggest that you use a cane or walker to help you keep your balance. You may need to have grab bars put in your bathroom near the toilet or in the shower.  Wear shoes that fit well and have soles that . Wear shoes both inside and outside. Use slippers with good . Do not wear shoes with high heels.  Wear a personal alarm. This is a device that allows you to call 911 if you fall and need help. Ask your healthcare provider for more information.  Stay active. Exercise can help strengthen your muscles and improve your balance. Your healthcare provider may recommend water aerobics or walking. He or she may also recommend physical therapy to improve your coordination. Never start an exercise program without talking to your healthcare provider first.  Walking for Exercise  Manage your medical conditions. Keep all appointments with your healthcare providers. Visit your eye doctor as directed.  Home safety tips:  Fall Prevention for Adults  Add items to prevent falls in the bathroom. Put nonslip strips on your bath or shower floor to prevent you from slipping. Use a bath mat if you do not have carpet in the bathroom. This will prevent you from falling when you step out of the bath or shower. Use a shower seat so you do not need to stand while you shower. Sit on the toilet or a chair in your bathroom to dry yourself and put on clothing. This will prevent you from losing your balance from drying or dressing yourself while you are standing.  Keep paths clear. Remove books, shoes, and other objects from walkways and stairs. Place cords for telephones and lamps out of the way so that you do not need to walk over them. Tape them down if you cannot move them. Remove small rugs. If you cannot remove a rug, secure it with double-sided tape. This will prevent you from tripping.  Install bright lights in your home. Use night lights to help light paths to the bathroom or kitchen. Always turn on the light before you start walking.  Keep items you use often on shelves within reach. Do not use a step stool to help you reach an item.  Paint or place reflective tape on the edges of your stairs. This will help you see the stairs better.  Follow up with your healthcare provider as directed: Write down your questions so you remember to ask them during your visits.    Prevención de caídas    LO QUE NECESITA SABER:    La prevención de caídas incluye formas de hacer más seguro castro hogar y otras áreas. También incluye cómo moverse más cuidadosamente para evitar cristel caída. Las condiciones médicas que provocan cambios en la presión arterial, la visión o la fuerza muscular y la coordinación pueden llegar a aumentar castro riesgo de caídas. Los medicamentos también pueden aumentar castro riesgo de caídas si le provocan mareos, debilidad o somnolencia.    INSTRUCCIONES SOBRE [facility]ITAL[facility]:  Llame al 911 o pídale a alguien más que lo naima si:  Se ha caído y está inconsciente.  Se ha caído y no puede  parte de castro cuerpo.  Comuníquese con castro médico si:  Se ha caído y tiene dolor en el cuerpo o dolor de rola.  Usted tiene preguntas o inquietudes acerca de castro condición o cuidado.  Recomendaciones para prevenir caídas:  Póngase de pie o siéntese despacio.[place] puede ayudarlo a mantener castro equilibrio y prevenir cristel caída.  Use dispositivos de apoyo shay se le indique.Castro médico le puede recomendar que use un bastón o un caminador para que lo asista en castro equilibrio. Es posible que necesite que le instalen barandas en el baño cerca al inodoro o en la ducha.  Use calzado que le quede navarro y tenga suelas antideslizantes.Use zapatos dentro y fuera de casa. Utilice pantunflas con cristel suela de buen agarre. No use zapatos con tacones altos.  Utilizar un dispositivo de alerta médica.Nancy es un dispositivo que puede llevar puesto y le permite llamar al 911 en flavio que se haya caído y necesite ayuda. Pídale a castro médico más información.  Manténgase activo.El ejercicio puede ayudar a fortalecer los músculos y mejorar castro equilibrio. [place] médico le puede recomendar hacer ejercicios aeróbicos acuáticos o caminar. También le puede recomendar la fisioterapia para mejorar castro coordinación. Nunca comience un programa de ejercicios sin consultarlo wili con castro médico.  Caminar para ejercitarse  Controle amy afecciones médicas.Cumpla con todas las citas con amy médicos. Visite al oculista shay se le ha indicado.  Recomendaciones para la seguridad en el hogar:  Prevención de caídas para adultos  Agregue elementos para prevenir caídas en el baño.Coloque tiras antideslizantes en el piso de la ducha o de la bañera para evitar resbalones. Use cristel alfombra de baño si no tiene alfombra en el cuarto de baño. [place] evitará que se caiga cuando sale de la ducha o la bañera. Use un asiento de ducha de modo que no necesitará ponerse de pie mientras se ducha. Siéntese en el inodoro o en cristel silla en el cuarto de baño para secarse y vestirse. [place] impedirá que pierda el equilibrio mientras se seca o se viste estando de pie.  Mantener los pasillos despejados.Despeje las vías y las escaleras por donde camina retirando los libros, los zapatos u otros objetos. Coloque los cables del teléfono y las lámparas fuera de castro fernando para que no tenga que caminar sobre ellos. Si no puede moverlos, sujételos con cinta adhesiva. Retire los tapetes pequeños. Si no puede quitar un tapete, sujételo con cinta de doble arielle. Lo cual evitará que se tropiece con éstos.  Instale cristel buena iluminación en castro hogar.Use lamparillas de noche para ayudar a iluminar los pasillos al baño o a la cocina. Siempre encienda la russell antes de empezar a caminar.  Mantenga los objetos que usa con frecuencia en estantes dentro de castro alcance.No use un banquito para intentar alcanzar un elemento.  Pinte o coloque cinta reflectiva en los bordes de las escaleras.Lo cual puede ayudarle a kaela mejor las escaleras.  Acuda a amy consultas de control con castro médico según le indicaron.Anote amy preguntas para que se acuerde de hacerlas daryl amy visitas.

## 2023-04-05 NOTE — ED ADULT NURSE NOTE - CHIEF COMPLAINT QUOTE
Pt arrives to ED s/p witnessed mechanical fall out of wheelchair. Hit back of head, laceration to back of head, denies LOC. No daily blood thinners. GCS 15.

## 2023-04-05 NOTE — ED PROVIDER NOTE - OBJECTIVE STATEMENT
89 year old female sent in from [facility] with hx of afib, heart disease, DNR/DNI, not on bloodthinners, at baseline nonambulatory, dementia at baseline, presents to the ED sent in from nursing home stating that theyre transferring pt due to fall. Hx limited secondary to pt being demented at baseline and is poor historian. Denies cp, abd pain.

## 2023-04-05 NOTE — ED PROVIDER NOTE - CLINICAL SUMMARY MEDICAL DECISION MAKING FREE TEXT BOX
Elderly female comes from nursing home after fall.  Currently demented but this is baseline.  She is complaining of pain to her head.  CT head and neck negative for acute pathology.  Vital signs are stable heart rate is normal no hypotension.  Neuro exams intact.  Upper extremity strength is strong 5 out of 5 with good  strength.  No numbness.  No other signs of trauma on meticulous physical exam.  Will DC back to nursing home for further care.

## 2023-04-05 NOTE — ED PROVIDER NOTE - PHYSICAL EXAMINATION
General: Demented at baseline, able to answer questions.  Head: No steps offs, bruising or hematoma noted throughout the skull. No otorrhea, rhinorrhea. No raccoon's sign or rodriguez's sign present. +small area of swelling to back of scalp, not actively bleeding, no laceration +abrasion to area.  Neck: Pt able to rotate neck 45 degrees to the left and right w/o difficulty or pain. No pain on palpation of the mid cervical spine. No step offs present.  Chest: No pain on palpation of the ribs. No pain on deep inspiration. No obvious deformities or bruising  Extremities: No obvious fractures or deformities. Sensation intact throughout. Full RoM.

## 2023-04-06 PROBLEM — R52 PAIN, UNSPECIFIED: Chronic | Status: ACTIVE | Noted: 2018-09-12

## 2023-04-06 PROBLEM — N30.01 ACUTE CYSTITIS WITH HEMATURIA: Chronic | Status: ACTIVE | Noted: 2018-09-12

## 2023-04-06 PROBLEM — E78.5 HYPERLIPIDEMIA, UNSPECIFIED: Chronic | Status: ACTIVE | Noted: 2018-09-12

## 2023-04-06 PROBLEM — I48.91 UNSPECIFIED ATRIAL FIBRILLATION: Chronic | Status: ACTIVE | Noted: 2018-09-12

## 2023-04-06 PROBLEM — M62.81 MUSCLE WEAKNESS (GENERALIZED): Chronic | Status: ACTIVE | Noted: 2018-09-12

## 2023-04-06 PROBLEM — A41.9 SEPSIS, UNSPECIFIED ORGANISM: Chronic | Status: ACTIVE | Noted: 2018-09-12

## 2023-05-12 ENCOUNTER — OUTPATIENT (OUTPATIENT)
Dept: OUTPATIENT SERVICES | Facility: HOSPITAL | Age: 88
LOS: 1 days | End: 2023-05-12
Payer: MEDICARE

## 2023-05-12 DIAGNOSIS — J98.6 DISORDERS OF DIAPHRAGM: ICD-10-CM

## 2023-05-12 PROCEDURE — 71260 CT THORAX DX C+: CPT | Mod: 26,MH

## 2023-05-12 PROCEDURE — 71260 CT THORAX DX C+: CPT

## 2023-11-14 NOTE — ED PROVIDER NOTE - CONTACT TIME
November 14, 2023       No Recipients    Patient: Dago Nunez   YOB: 1942   Date of Visit: 11/14/2023     Dear Gilberto Mills MD:       Thank you for referring Dago Nunez to me for evaluation. Below are the relevant portions of my assessment and plan of care.    If you have questions, please do not hesitate to call me. I look forward to following Dago along with you.         Sincerely,        Drake Stafford MD        CC:   No Recipients    Drake Stafford MD  11/14/23 1427  Sign when Signing Visit  [facility] HEMATOLOGY & ONCOLOGY  [address]  [place] 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  She is on adjuvant letrozole for the past 25.5 months.  She had declined adjuvant radiation.  She is also seen for macrocytic anemia from chronic kidney disease Stage IIIa, GFR 51 mL/minute 03/05/2018, iron deficiency, and thrombocytopenia.  She is intolerant to oral iron (nausea, stomach cramps, and constipation).  She had ferric carboxymaltose 17.75 months ago. She is also seen for vulvar cancer. She is seen 37.25 months post C1D1 Mitomycin C and 5FU with radiation. Her D29 to 32 chemo was cancelled due to hospitalization, poor tolerance, and poor performance status.  She is seen with spouse, Joseph. History is obtained from the patient.  History is considered reliable.         .    Oncology/Hematology History Overview Note   DIAGNOSTIC ABNORMALITIES: Breast cancer.  Screening mammogram 08/18/2021.New dense 7 mm partially obscured nodule in the upper outer quadrant of the left breast, with associated architectural distortion.  Diagnostic mammogram 08/20/2021.   Small subcentimeter suspicious spiculated mass at 12:00 in the left breast, approximately 3 cm to 4 cm deep from the nipple. This is suspicious for breast carcinoma, ultrasound-guided biopsy is recommended.  Sonogram 2021.  Small suspicious solid mass at 12:00 in the left breast. 5.5 x 5.1 x 4.7 mm, suspicious for breast carcinoma. This corresponds with the finding on mammograms.  Successful ultrasound-guided left breast biopsy and clip on 2021.  Pathology report 2021.  Left breast at 12:00, core needle biopsies: Invasive carcinoma no special type (ductal).  Histologic grade (Abdullahi histologic score).   Glandular (acinar)/tubular differentiation: Score 1.   Nuclear pleomorphism: Score 2.   Mitotic rate: Score 1.  Overall grade: Grade 1. Maximum tumor diameter is at least 0.8 cm.  Estrogen 87%, progesterone 47% and negative HER-2/gabriela.  Genetic testing was sent.  Patient was seen by Dr. Quynh Rosario 2021.  She is .  She had first delivery at 18.  She took birth control for 1 month.  She took hormone replacement therapy for approximately 5 years.  Family history positive for breast cancer, sister at 78. No ovarian cancer  Chest x-ray 2021.  No acute cardiopulmonary process.  Pathology report 2021.  Left sentinel lymph nodes: Four of 4 lymph nodes are negative for metastatic mammary carcinoma.Comment: Absence of micrometastases is confirmed utilizing immunohistochemical stains for pankeratin.  Left breast, partial mastectomy:  A.  Invasive carcinoma of no special type (ductal), grade 1 (1.1 cm in greatest dimension).  B.  Minor associated low-grade ductal carcinoma in situ component.  C.  The inked surgical margins and skin are negative for malignancy.  D.  Prior biopsy site changes including fat necrosis.  Comment: Microscopically, the tumor is approximately 7 mm from the closest inked (superior) surgical margin.  AJCC stage: pT1c snpN0.      PREVIOUS INTERVENTIONS:  She  had undergone left partial mastectomy with left sentinel lymph node biopsy 09/14/2021 by Dr. Rosario.  Declined adjuvant radiation.  Adjuvant Femara 10/01/2021 through present.        DIAGNOSTIC ABNORMALITIES: Vulvar cancer  She had developed recurrent vulvar cancer left inguinal node that is PET positive measuring 2.1 cm.  The patient was seen by Dr. Marks in favor definitive chemo radiation.  Pathology report 07/10/2020 periurethral biopsy, poorly differentiated carcinoma with squamous and papillary features, focally invasive in the subepithelial connective tissue.  PET 07/31/2020 showed intense FDG avid left inguinal node is highly suspicious for local regional metastasis from known vulvar squamous cell carcinoma.  No additional sites of suspicious FDG activity.  MRI 07/31/2020 showed redemonstration of mildly T2 hyperintense mass involving the urethral meatus, similar dimensions to prior exam on 02/18/2020 however there is now a polypoid lesion seen along the anterior aspect of the perineum, possibly contiguous with the urethral mass, concerning for disease progression.  Market interval enlargement of the left inguinal node almost certainly representing disease involvement.  Patient was notified by Dr. Siddiqui 08/19/2020.  Consensus for chemoradiation.  Patient reluctant to take chemotherapy.  Follow-up with Dr. Siddiqui in 4 to 6 weeks after radiation is completed.         PREVIOUS INTERVENTIONS:  Mitomycin C and 5-FU 10/05/2020 through 10/08/2020 with radiation completed 12/10/2020 at [facility].  D29 to d32 of chemo discontinued due to poor performance status.       DIAGNOSTIC ABNORMALITIES:   The patient was seen by Dr. Greg Alberts 01/29/2018 complaining of coughing and wheezing. The patient was given Tussionex. Blood work was ordered. CBC 01/29/2018 revealed a WBC of 7.8, hemoglobin 11.2, hematocrit 35.1, MCV 96.2, platelets 43,000, and ANC 4.47. CMP remarkable for of glucose of 177  "and GFR 59 mL/minute.  The patient was seen by VINCENT Jones, 02/02/2018. She was coughing and wheezing. Tussionex did not help. The patient was given prednisone.  The patient was seen by VINCENT Jones, 02/15/2018. She is followed for anemia from iron deficiency. CBC 02/15/2018 revealed a WBC of 12.9, hemoglobin 11.4, hematocrit 34.5, MCV 95, and platelets 68,000.       PREVIOUS INTERVENTIONS:   Ferrous sulfate 325 mg 03/07/2018 through 05/06/2018.  Not resumed 06/08/2018. \"I misunderstood.\"   Resume 09/05/2018 through 10/03/2018, stopped due to intolerance.  Injectafer 750 mg 10/20/2018 at the [place] office.  Retacrit 10/27/2020 through present.  Injectafer 750 mg 05/18/2021, 01/26/2022 and 05/25/2022 at [facility]           Vulvar cancer, carcinoma    Initial Diagnosis    Vulvar cancer, carcinoma (CMS/HCC)     3/19/2001 Biopsy    Clinical Features: red vaginal lesion with bleeding since 1995. TX with  Carafate douche and Premarin vaginal cream with intermittent improvement.    DIAGNOSIS:    VAGINA, BIOPSY: FOCAL ULCERATION, MARKED ACUTE AND CHRONIC INFLAMMATION,  SPONGIOSIS AND REACTIVE ATYPIA; NEGATIVE FOR DYSPLASIA.     8/17/2001 Procedure    Pap Smear:  DIAGNOSIS:            Atypical keratinized squamous cells, suspicious                           for squamous cell carcinoma.         COMMENTS:             There are numerous atypical keratinized cells                           present in an inflammatory background.  These are                           suspicious for squamous cell carcinoma.  Biopsy                           confirmation is recommended.      10/16/2001 Procedure    Pap Smear:  DIAGNOSIS:            Mild dysplasia       ADDITIONAL FINDINGS:  Marked inflammation                           Excessive hyperkeratosis         BETHESDA SYSTEM:      Low grade squamous intraepithelial lesion    DIAGNOSIS:            Negative, no abnormal cells seen           BETHESDA SYSTEM:     " 05-Apr-2023 21:00  Within normal limits.       ADEQUACY:             Specimen satisfactory for evaluation       SOURCE:               Vagina, diagnostic thin prep PAP     11/7/2001 Biopsy      DIAGNOSIS:    VAGINA AND VULVA, RIGHT POSTERIOR INTROITUS, WIDE LOCAL EXCISION:  VAGINAL INTRAEPITHELIAL NEOPLASIA (VAIN) II-III, FOCALLY EXTENDING TO  VAGINAL MARGIN AT 12-4:00; ALL OTHER MARGINS NEGATIVE FOR  INTRAEPITHELIAL NEOPLASIA. (SEE COMMENT)    COMMENT: The lesion involves vaginal type epithelium with associated  chronic inflammation and erosion. The adjacent vulvar epithelium is  hyperkeratotic.     3/15/2002 Procedure    Pap Smear:  BETHESDA SYSTEM:      High grade squamous intraepithelial lesion       DESCRIPTOR:           Moderate dysplasia           ADEQUACY:             Specimen satisfactory for evaluation       SOURCE:               Vagina, Thin Prep Pap, Diagnostic     6/13/2003 Procedure         BETHESDA SYSTEM:      High grade squamous intraepithelial lesion       DESCRIPTOR:           Moderate dysplasia       ADDITIONAL FINDINGS:  Inflammation         ADEQUACY:             Specimen satisfactory for evaluation       SOURCE:               Vagina, Thin Prep Pap, Diagnostic    HUMAN PAPILLOMAVIRUS         CASE ACCESSION #:    [id number]        Category                  HPV Types                Patient Results         High/Intermed Risk    16,18,31,33,35,39              Negative                            45,51,52,56,58,59,68      Specimen Source        Cervical / Vaginal Specimen     12/5/2003 Procedure    Gynecological Cytology        CASE ACCESSION #:     [id number]       BETHESDA SYSTEM:      Low grade squamous intraepithelial lesion       DESCRIPTOR:           Mild dysplasia           ADEQUACY:             Specimen satisfactory for evaluation       SOURCE:               Vagina, Thin Prep Pap, Diagnostic     11/29/2011 Biopsy    ADDENDUM FINDINGS:    The high grade MOODY in the right labia majora biopsy was of the usual  type.  There was no evidence of differentiated MOODY.      DIAGNOSIS:    1) PERINEUM, BIOPSY: SQUAMOUS EPITHELIUM WITH FLORID HYPERKERATOSIS,  CONSISTENT WITH CHRONIC IRRITATION; NO EVIDENCE OF DYSPLASIA OR MALIGNANCY  (SEE COMMENT).    Comment: Immunohistochemical studies (p16, p53, MIB-1) confirm the rendered  interpretations.    2) VULVA, RIGHT LABIUM MAJORUM, BIOPSY: VULVAR INTRAEPITHELIAL NEOPLASIA II  (MODERATE DYSPLASIA); (SEE COMMENT).    Comment: Immunohistochemical studies for p16 (nuclear and cytoplasmic  positivity in lower epithelial half) and MIB-1 (nuclear positivity in lower  epithelial half) confirms the diagnosis; p53 is positive only in rare  cells. A GMS histochemical study for fungal forms is negative.  Nevertheless, parakeratosis associated with neutrophils, as was seen  herein, is commonly associated with fungal vulvitis.     7/3/2012 Procedure    Gynecological Cytology    CASE ACCESSION #:                     [id number]  BETHESDA SYSTEM:                      High grade squamous intraepithelial                                        lesion  DESCRIPTOR:                           Mild to moderate dysplasia  ADEQUACY:                             Specimen satisfactory for evaluation  SOURCE:                               Vagina, Thin Prep Pap, Diagnostic  # [id number] REVIEWED:                    1     1/29/2013 Biopsy    DIAGNOSIS:    1) VULVA, RIGHT, ANTERIOR, BIOPSY:  SPONGIOTIC DERMATITIS WITH EXTENSIVE  DERMAL GRANULATION TISSUE, MIXED INFLAMMATION AND FIBROSIS (SEE COMENT).    Comment: Sections show spongiosis, basement membrane thickening, dermal  fibrosis, and extensive dermal granulation tissue with a predominantly  chronic inflammatory infiltrate. There is no evidence of dysplasia or  malignancy. The basement membrane thickening and dermal fibrosis suggests  that there may be component of lichen sclerosus. However, the underlying  cause of the ongoing inflammation and repair (granulation  tissue) is not  clear from this sample. A tissue gram stain fails to reveal any large  bacterial aggregates.  GMS and AFB studies for fungal forms and acid fast  bacilli were negative respectively     11/27/2013 Surgery      Diagnosis    1) VULVA, LEFT ANTERIOR, BIOPSY: HIGH GRADE SQUAMOUS INTRAEPITHELIAL LESION (SEVERE DYSPLASIA, MOODY III).    2) VAGINAL WALL, ANTERIOR, BIOPSY: HIGH GRADE SQUAMOUS INTRAEPITHELIAL LESION (SEVERE DYSPLASIA, VaIN III).    3) VULVA, VULVECTOMY: FOCUS OF MICROINVASIVE SQUAMOUS CELL CARCINOMA, WELL-DIFFERENTIATED, DEPTH OF STROMAL INVASION 0.4 MM,  8 MM FROM CLOSEST DEEP MARGINS, 4 MM FROM RIGHT ANTERIOR VAGINAL MARGINS AT 8 - 9 O'CLOCK (PROXIMAL TIP OF SPECIMEN DESIGNATED   12 O'CLOCK); NEGATIVE FOR LYMPHOVASCULAR INVASION; ARISING IN A BACKGROUND OF EXTENSIVE VULVAR INTRAEPITHELIAL NEOPLASIA (SEVERE DYSPLASIA, MOODY III, CLASSICAL AND DIFFERENTIATED TYPES); MOODY III IS PRESENT AT RIGHT LATERAL SURGICAL MARGIN (7 - 9 O'CLOCK),   LEFT LATERAL SURGICAL MARGIN (3 - 5 O'CLOCK) AND RIGHT AND LEFT VAGINAL MARGINS; TOTAL LESIONAL AREA (INVASIVE CARCINOMA AND MOODY III) MEASURES 8.2 CM IN GREATEST DIMENSION (GROSS MEASUREMENT); BACKGROUND SEVERE INTERFACE DERMATITIS AND LICHEN SCLEROSUS.        **Electronically signed out by Dimas Cardenas M.D.**on 12/2/2013  HAYLEY/YAZMIN/franky  Case reviewed by Attending Pathologist      Synoptic Diagnosis  3)  VULVA:     Specimen:                        Vulva  Procedure:                       Other: Vulvectomy  Lymph Node Sampling:             Not applicable  Specimen Size:                   Greatest dimension: 13.5 cm                                   Additional dimension: 9.5 cm                                   Additional dimension: 1.2 cm  Tumor Site:                      Right vulva, labium minus  Tumor Size:                      Greatest dimension: 0.04 cm  Tumor Focality:                  Unifocal  Histologic Type:                 Squamous cell  carcinoma  Histologic Grade:                G1: Well differentiated  Microscopic Tumor Extension:     Depth of invasion: 0.4 mm  Margins:                         Uninvolved by invasive carcinoma                                   Distance of invasive carcinoma from closest margin: 4 mm  Lymph-Vascular Invasion:         Not identified  Lymph Nodes:                     No nodes submitted or found  Extranodal Extension:            Cannot be determined (explain):    Fixed or Ulcerated Femoral-Inguinal Lymph Nodes:                                    Not identified  Laterality of Involved Lymph Nodes:                                    Cannot be determined:    Primary Tumor (pT):              pT1a [FIGO IA]: Lesions 2 cm or less in size, confined to the vulva or perineum, and with stromal invasion 1.0 mm or less  Regional Lymph Nodes (pN):       pNX:  Regional lymph nodes cannot be assessed  Distant Metastasis (pM):         Not applicable  Additional Pathologic Findings:  Vulvar intraepithelial neoplasia (MOODY) 3 (severe dysplasia/carcinoma in situ)  --------------------------------------------------------     5/20/2014 Procedure    Gynecologic Cytology  [place] Diagnosis:  High grade squamous intraepithelial lesion.    Descriptor/Additional Findings:  Moderate dysplasia.    Adequacy:  Specimen satisfactory for evaluation.    Source:  Vagina, Thin Prep Pap, Imaged Diagnostic     11/11/2014 Biopsy    Diagnosis    ANTERIOR VAGINAL WALL, BIOPSY:  HIGH GRADE SQUAMOUS INTRAEPITHELIAL LESION (VaIN 3, SEVERE DYSPLASIA)       12/19/2014 Surgery    Diagnosis  1)  ANTERIOR VAGINAL WALL, PARTIAL VAGINECTOMY: HIGH-GRADE SQUAMOUS INTRAEPITHELIAL LESION (VaIN 3, SEVERE DYSPLASIA), 2.7 CM; HIGH GRADE DYSPLASIA EXTENDS TO THE 2 AND 8 O'CLOCK TIP MARGINS, THE 5-8 O'CLOCK LATERAL MARGIN, AND THE 11-2 O'CLOCK LATERAL   MARGIN.          2)  JOYA-CLITORAL AREA, EXCISION: HIGH-GRADE SQUAMOUS INTRAEPITHELIAL LESION (VaIN 3, SEVERE DYSPLASIA),  "EXTENDING TO A LATERAL MARGIN.         2/9/2015 Surgery    Diagnosis  1.          VULVA, LEFT PERIURETHRAL PORTION, EXCISION: FOCAL HIGH-GRADE SQUAMOUS INTRAEPITHELIAL LESION (MOODY 2, MODERATE DYSPLASIA); MARGINS ARE NEGATIVE FOR DYSPLASIA.    2.          VULVA, RIGHT PERIURETHRAL PORTION, EXCISION: BENIGN SQUAMOUS MUCOSA WITH ACUTE AND CHRONIC INFLAMMATION AND REACTIVE CHANGES.         3.          VULVA, LEFT, LOWER PORTION, EXCISION: BENIGN SQUAMOUS MUCOSA WITH ACUTE AND CHRONIC INFLAMMATION, REACTIVE CHANGES AND FEATURES CONSISTENT WITH PREVIOUS SURGICAL PROCEDURE.         4.          VULVA, RIGHT PORTION, EXCISION: BENIGN SQUAMOUS MUCOSA WITH CHRONIC INFLAMMATION AND DERMAL FIBROSIS.        9/6/2017 Procedure    Diagnosis  \"PAP SMEAR FROM THE URETHRA\":  HIGH GRADE SQUAMOUS INTRAEPITHELIAL LESION.          10/26/2017 Biopsy    Urethral Meatus Biopsy:  Urothelial mucosa with ulceration, chronic inflammation and focal high grade squamous intraepithelial lesion, moderate dysplasia     7/10/2018 Imaging    MRI Pelvis:  Impression:    1.  There is a 2.3 x 1.8 cm urethral lesion at the external urethral   meatus demonstrating enhancement and T2 hyperintensity. The lesion is   located approximately 8 mm from the bladder neck/internal urethral   orifice.  There is no extension of the lesion into the para vaginal   fat or ischiorectal fossa. There is some edema of the bilateral   ischiocavernosus muscles without invasion by the mass. No pelvic or   inguinal adenopathy is identified.     2.  The patient has a 2.5 cm cystocele and the urethra is almost   horizontal. There is pelvic floor laxity with loss of upper convexity   of the left iliococcygeus muscle.     7/20/2018 Biopsy    Diagnosis  URETHRA, BIOPSY:  SQUAMOUS CELL CARCINOMA IN SITU; SEE COMMENT.    Comments  P16 immunostain and HPV RNA in situ hybridization are strongly and diffusely positive within the lesion, consistent with HPV-related pathogenesis.     1/8/2019 " Imaging    MRI Pelvis:  1.  Stable size and appearance of a nonspecific enhancing nodular   lesion at the caudal margin of the vaginal introitus adjacent to   extensive postsurgical changes related to prior vulvectomy and   vaginectomy. This lesion does not appear to conform to the expected   course of the urethra which is somewhat poorly defined, and this felt   more likely be located immediately caudal to the urethra. It is   possible this may reflect postsurgical scarring as opposed to a true   lesion. Continued follow-up is suggested to ensure stability.  2.  No lymphadenopathy or other evidence of metastatic disease in the   pelvis.  3.  Evidence of pelvic floor laxity with cystocele, not significantly   changed.     8/6/2019 Imaging    MRI Pelvis:  1. No significant change from the prior exam on this limited   noncontrast study.  2. Stable indeterminate nodule anterior to the external urethral   meatus which may represent focal scarring; however,   residual/recurrent disease is not entirely excluded.  Recommend continued attention on follow-up. 3. Extensive pelvic   postsurgical changes with no evidence of local recurrence.  4. Pelvic floor laxity with cystocele unchanged from prior exam.     1/13/2020 Procedure    Urethral stricture dilation     2/18/2020 Imaging    MRI Pelvis:  Impression:  1.  No significant interval change in 1.7 x 1.7 cm T2 hyperintense   ovoid lesion at the urethral meatus.  2.  Numerous postoperative findings status post vaginectomy and   hysterectomy.  3.  Pelvic floor laxity with persistent cystocele.  4.  No pelvic adenopathy or bony lesion identified.     5/13/2020 Procedure    Urethral stricture dilation      7/10/2020 Biopsy    Diagnosis  PERIURETHRA, BIOPSY: POORLY DIFFERENTIATED CARCINOMA WITH SQUAMOUS AND PAPILLARY FEATURES, FOCALLY INVASIVE IN THE SUBEPITHELIAL CONNECTIVE TISSUE; SEE COMMENT.    Comments  The patient's history of vulvar microinvasive squamous cell carcinoma is  noted. The current biopsy shows a poorly differentiated carcinoma with papillary formation. P16 immunostain and HPV RNA in situ hybridization are strongly and diffusely positive within the lesion, consistent with HPV-related pathogenesis. A KERRI-3 immunostain shows patchy, weak positivity in the lesional cells. The patient's prior biopsy ([id number]) is reviewed and shows similar morphologic and immunophenotypic features but the papillary features were not present in the prior material.  Dr. Ilene Pablo reviewed this case and concurs with the diagnosis.      7/31/2020 Imaging    MRI Pelvis:  1.  Redemonstration of a mildly T2 hyperintense mass involving the   urethral meatus, similar dimensions to prior exam on 2/18/2020,   however there is now a polypoid lesion seen along the anterior aspect   of the perineum, possibly contiguous with the urethral mass,   concerning for disease progression. This could potentially represent   the recently biopsied lesion.  2.  Marked interval enlargement of a left inguinal lymph node, almost   certainly representing disease involvement. This would be amenable to   ultrasound-guided biopsy if warranted.  3.  Findings related to pelvic floor laxity, with cystocele.    PET/CT:  1.  Intensely FDG avid left inguinal lymph node is highly suspicious   for locoregional metastasis from known vulvar squamous cell   carcinoma. There are no additional sites of suspicious FDG activity.  2.   Intense physiologic FDG activity within the urine obscures   visualization of the periurethral mass. Findings are better   characterized on same day pelvic MRI.     9/21/2020 - 12/10/2020 Radiation    Radiation OncologyTreatment Course:  Dago Nunez received 6940 cGy in 38 fractions to vulva via external beam radiation therapy.     10/5/2020 -  Chemotherapy    OP Vulvar MitoMYcin / Fluorouracil CIV + XRT       10/9/2020 - 9/7/2021 Chemotherapy    OP CENTRAL VENOUS ACCESS DEVICE ACCESS, CARE, AND  MAINTENANCE ([facility])     10/19/2020 Imaging    CT Head:  Impression:    1. No acute intracranial process.     10/21/2021 -  Chemotherapy    OP CENTRAL VENOUS ACCESS DEVICE ACCESS, CARE, AND MAINTENANCE ([facility])     Secondary malignancy of inguinal lymph nodes   8/31/2020 Initial Diagnosis    Secondary malignancy of inguinal lymph nodes (CMS/HCC)     10/9/2020 - 9/7/2021 Chemotherapy    OP CENTRAL VENOUS ACCESS DEVICE ACCESS, CARE, AND MAINTENANCE ([facility])     10/21/2021 -  Chemotherapy    OP CENTRAL VENOUS ACCESS DEVICE ACCESS, CARE, AND MAINTENANCE ([facility])         PAST MEDICAL HISTORY:  ALLERGIES:  Allergies   Allergen Reactions   • Scopolamine Swelling     Other reaction(s): ANGIOEDEMA       • Amoxicillin-Pot Clavulanate Rash   • Keflex [Cephalexin] Rash   • Septra [Sulfamethoxazole-Trimethoprim] Rash   • Tequin [Gatifloxacin] Other (See Comments)     Doesn't remember   • Trovan [Alatrofloxacin] Dizziness     CURRENT MEDICATIONS:  Outpatient Encounter Medications as of 11/14/2023   Medication Sig Dispense Refill   • Acetaminophen (TYLENOL ARTHRITIS PAIN PO) Take 1 tablet by mouth Daily As Needed (BACK PAIN).     • albuterol sulfate  (90 Base) MCG/ACT inhaler Inhale 2 puffs 4 (Four) Times a Day. 54 g 3   • bisoprolol-hydrochlorothiazide (ZIAC) 5-6.25 MG per tablet Take 1 tablet by mouth Daily. 90 tablet 1   • calcium carbonate (OS-REAGAN) 600 MG tablet Take 1 tablet by mouth Daily.     • cetirizine (zyrTEC) 10 MG tablet Take 1 tablet by mouth Daily.     • citalopram (CeleXA) 20 MG tablet TAKE 1 TABLET BY MOUTH EVERY DAY (Patient taking differently: 0.5 tablets.) 90 tablet 1   • cyanocobalamin 1000 MCG/ML injection INJECT 1 ML INTO THE MUSCLE EVERY 4 WEEKS. 3 mL 10   • diphenhydrAMINE (BENADRYL) 25 mg capsule Take 1 capsule by mouth Every 6 (Six) Hours As Needed for Allergies.     • diphenoxylate-atropine (LOMOTIL) 2.5-0.025 MG per tablet TAKE 2 TABLETS BY MOUTH 4 TIMES DAILY AS NEEDED FOR DIARRHEA 90 tablet 2   •  "DULERA 100-5 MCG/ACT inhaler Inhale 2 puffs 2 (Two) Times a Day. Rinse and spit after using. 6 inhaler 0   • esomeprazole (nexIUM) 40 MG capsule Take 1 capsule by mouth 2 (Two) Times a Day. 180 capsule 3   • furosemide (LASIX) 40 MG tablet Take 1 tablet by mouth 2 (Two) Times a Day. 180 tablet 3   • gabapentin (NEURONTIN) 600 MG tablet Take 1 tablet by mouth 3 (Three) Times a Day. 90 tablet 2   • Homeopathic Products (LEG CRAMPS) tablet Take 1 tablet by mouth Daily.     • HYDROcodone-acetaminophen (NORCO)  MG per tablet Take 0.5 tablets by mouth Every 4 (Four) Hours As Needed for Moderate Pain or Severe Pain. 60 tablet 0   • letrozole (FEMARA) 2.5 MG tablet Take 1 tablet by mouth Daily. 90 tablet 2   • lidocaine (XYLOCAINE) 5 % ointment Apply  topically to the appropriate area as directed Every 4 (Four) Hours As Needed for Mild Pain  (pain secondary to radiation). 240 g 1   • Lidocaine Viscous HCl (XYLOCAINE) 2 % solution Take 5 mL by mouth 3 (Three) Times a Day With Meals. 100 mL 0   • metOLazone (ZAROXOLYN) 2.5 MG tablet TAKE 1 TABLET BY MOUTH THREE TIMES A WEEK 30 tablet 2   • nitrofurantoin (MACRODANTIN) 25 MG capsule Take 1 capsule by mouth Every Night. To help reduce pain with urination 7 capsule 0   • ondansetron (ZOFRAN) 8 MG tablet TAKE 1 TABLET BY MOUTH EVERY 8 HOURS AS NEEDED FOR NAUSEA AND VOMITING 60 tablet 2   • potassium chloride (K-DUR,KLOR-CON) 20 MEQ CR tablet TAKE 2 TABLETS BY MOUTH ONCE EVERY DAY 60 tablet 5   • pravastatin (PRAVACHOL) 40 MG tablet TAKE 1 TABLET EVERY NIGHT 90 tablet 3   • Syringe 25G X 1\" 3 ML misc 1 each Daily. 25 each 1   • vitamin D (ERGOCALCIFEROL) 1.25 MG (15933 UT) capsule capsule TAKE 1 CAPSULE BY MOUTH ON THE SAME DAY EACH WEEK. 12 capsule 3     Facility-Administered Encounter Medications as of 11/14/2023   Medication Dose Route Frequency Provider Last Rate Last Admin   • heparin injection 500 Units  500 Units Intravenous PRN Drake Stafford MD   500 Units at " 07/01/21 1354   • heparin injection 500 Units  500 Units Intravenous PRN Drake Stafford MD   500 Units at 01/11/22 1134   • heparin injection 500 Units  500 Units Intravenous PRN Drake Stafford MD   500 Units at 09/28/22 1418   • heparin injection 500 Units  500 Units Intravenous PRN Drake Stafford MD   500 Units at 01/25/23 1537   • heparin injection 500 Units  500 Units Intravenous PRN Drake Stafford MD   500 Units at 06/26/23 1426   • lidocaine (XYLOCAINE) 1 % injection 10 mL  10 mL Infiltration Once Shaheen Akbar DO       • lidocaine 1% - EPINEPHrine 1:899003 (XYLOCAINE W/EPI) 1 %-1:341211 injection 10 mL  10 mL Infiltration Once Shaheen Akbar DO       • sodium chloride 0.9 % flush 10 mL  10 mL Intravenous PRN Drake Stafford MD   10 mL at 07/01/21 1354   • sodium chloride 0.9 % flush 10 mL  10 mL Intravenous PRN Drake Stafford MD   10 mL at 01/11/22 1134   • sodium chloride 0.9 % flush 10 mL  10 mL Intravenous PRN Drake Stafford MD   10 mL at 09/28/22 1418   • sodium chloride 0.9 % flush 10 mL  10 mL Intravenous PRN Drake Stafford MD   10 mL at 01/25/23 1537   • sodium chloride 0.9 % flush 10 mL  10 mL Intravenous PRN Drake Stafford MD   10 mL at 06/26/23 1426     ADULT ILLNESSES:  Patient Active Problem List   Diagnosis Code   • Meatal stenosis [id number]   • Retention of urine R33.9   • Type 2 diabetes mellitus, without long-term current use of insulin E11.9   • Essential hypertension I10   • Grief reaction F43.21   • 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   • Chronic midline low back pain without sciatica M54.50, G89.29   • Bilateral lower extremity edema R60.0   • History of urethral stricture Z87.448   • Epidermal cyst of neck L72.0   • Normocytic anemia D64.9   • Neck abscess L02.11   • Mixed hyperlipidemia E78.2   • Gastroesophageal reflux disease without esophagitis K21.9   • Anxiety F41.9   • Iron deficiency and chemotherapy induced anemia D50.9   • Stage 3 chronic kidney disease  N18.30   • Anemia in stage 3 chronic kidney disease N18.30, D63.1   • Screening for breast cancer Z12.39   • Lower extremity edema R60.0   • Vulvar cancer, carcinoma C51.9   • Former smoker Z87.891   • 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   • Febrile illness R50.9   • Chemotherapy-induced thrombocytopenia D69.59, T45.1X5A   • Hyponatremia E87.1   • Hypokalemia E87.6   • Neutropenic fever D70.9, R50.81   • Moderate malnutrition E44.0   • Antineoplastic chemotherapy induced anemia D64.81, T45.1X5A   • History of radiation therapy Z92.3   • Encounter for care related to Port-a-Cath Z45.2   • Malignant neoplasm of upper-outer quadrant of left breast in female, estrogen receptor positive C50.412, Z17.0   • Encounter for care related to vascular access port Z45.2   • Angiodysplasia K55.20   • Bronchitis J40   • Obesity (BMI 30-39.9) E66.9   • Wheezing R06.2   • Cough R05.9   • 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 Laguerre-Normal colonoscopy; Normal terminal ileum; Repeat 5 years   • COLONOSCOPY N/A 11/05/2021    Petechia(e) in the rectum, in the recto-sigmoid colon and in the distal sigmoid colon; No specimens collected; No plans to repeat colonoscopy due to advance age and/or medical problems   • CYSTOSCOPY N/A 09/20/2022    Procedure: CYSTOSCOPY WITH URETHRAL DILATATION;  Surgeon: Shaheen Conway MD;  Location: [facility];  Service: Urology;  Laterality: N/A;   • ENDOSCOPY  07/01/2014     Normal esophagus; Normal stomach; Normal examined duodenum; BRAVO pH capsule deployed;    • ENDOSCOPY  08/14/2013    Mild gastritis-biopsies for H.Pylor obtained   • ENDOSCOPY  02/16/2005    Dr. LaguerreQprmr-Seohhsfjd-blbltdkm   • ENDOSCOPY  10/21/2003    Dr. Laguerre-Stage 1 reflux esophagitis   • ENDOSCOPY N/A 11/05/2021    Small HH; A single gastroesophageal junction polyp; Normal stomach; A single non-bleeding angiodysplastic lesion in the duodenum   • HYSTERECTOMY     • MASTECTOMY W/ SENTINEL NODE BIOPSY Left 09/14/2021    Procedure: LEFT PARTIAL MASTECTOMY WITH MAGSEED AND LEFT SENTINEL LYMPH NODE BIOPSY MAGTRACE;  Surgeon: Quynh Rosario MD;  Location:  [facility];  Service: General;  Laterality: Left;   • TONSILLECTOMY     • VAGINA SURGERY      Laser surgery X 2   • VENOUS ACCESS DEVICE (PORT) INSERTION N/A 09/29/2020    Procedure: SINGLE LUMEN PORT - A- CATH PLACEMENT WITH FLUOROSCOPY;  Surgeon: Quynh Rosario MD;  Location:  [facility];  Service: General;  Laterality: N/A;     HEALTH MAINTENANCE ITEMS:  Health Maintenance Due   Topic Date Due   • DIABETIC EYE EXAM  11/25/2020   • COVID-19 Vaccine (3 - Moderna risk series) 01/25/2022   • LIPID PANEL  05/07/2022   • ZOSTER VACCINE (2 of 2) 08/10/2023       <no information>  Last Completed Colonoscopy            COLORECTAL CANCER SCREENING (COLONOSCOPY - Every 10 Years) Next due on 11/5/2031 11/05/2021  Surgical Procedure: COLONOSCOPY    11/05/2021  COLONOSCOPY    01/29/2014  Outside Claim: [facility] FECAL BLOOD SCRN IMMUNOASSAY    02/01/2013  Outside Claim: CHG BLOOD,OCCULT,FECAL HGB,FECES,1-3 SIMULT    01/12/2011  SCANNED - COLONOSCOPY    Only the first 5 history entries have been loaded, but more history exists.                  Immunization History   Administered Date(s) Administered   • COVID-19 (MODERNA) 1st,2nd,3rd Dose Monovalent 03/26/2021, 04/23/2021   • COVID-19 (MODERNA) Monovalent Original Booster 12/28/2021   • FLUAD TRI 65YR+ 10/22/2019   • Flu  Vaccine Split Quad 10/12/2018   • Fluad Quad 65+ 11/09/2020   • Fluzone High Dose =>65 Years ([employer] ONLY) 10/01/2018, 11/12/2021   • Fluzone High-Dose 65+yrs 11/12/2021, 10/24/2022, 10/24/2023   • Pneumococcal Conjugate 20-Valent (PCV20) 10/24/2022   • Pneumococcal Polysaccharide (PPSV23) 10/16/2013   • Pneumococcal, Unspecified 10/01/2017   • Shingrix 06/15/2023   • Tdap 05/01/2019   • Zostavax 01/14/2010, 10/01/2015     Last Completed Mammogram            Ordered - MAMMOGRAM (Yearly) Ordered on 11/13/2023      10/30/2023  Mammo Diagnostic Digital Tomosynthesis Bilateral With CAD    10/10/2022  Mammo Diagnostic Digital Tomosynthesis Bilateral With CAD    03/07/2022  Mammo Diagnostic Digital Tomosynthesis Left With CAD    08/20/2021  Mammo Diagnostic Digital Tomosynthesis Left With CAD    08/18/2021  Mammo Screening Digital Tomosynthesis Bilateral With CAD    Only the first 5 history entries have been loaded, but more history exists.                      FAMILY HISTORY:  Family History   Problem Relation Age of Onset   • Cancer Mother    • Hypertension Mother    • Osteoporosis Mother    • Dementia Mother    • Uterine cancer Mother    • Heart disease Father    • Parkinsonism Father    • Cancer Sister    • Breast cancer Sister    • Kidney cancer Sister    • Diabetes Brother    • Heart disease Paternal Grandfather    • No Known Problems Maternal Grandmother    • No Known Problems Maternal Grandfather    • No Known Problems Paternal Grandmother    • Colon cancer Neg Hx    • Colon polyps Neg Hx    • Esophageal cancer Neg Hx    • Liver cancer Neg Hx    • Liver disease Neg Hx    • Rectal cancer Neg Hx    • Stomach cancer Neg Hx      SOCIAL HISTORY:  Social History     Socioeconomic History   • Marital status:    Tobacco Use   • Smoking status: Former     Packs/day: 0.25     Years: 3.00     Additional pack years: 0.00     Total pack years: 0.75     Types: Cigarettes     Passive exposure: Past   • Smokeless  "tobacco: Never   • Tobacco comments:     social smoker in college   Vaping Use   • Vaping Use: Never used   Substance and Sexual Activity   • Alcohol use: Not Currently     Comment: Occasional glass of wine   • Drug use: No   • Sexual activity: Defer       REVIEW OF SYSTEMS:    Review of Systems   Constitutional:  Positive for fatigue. Negative for fever.        \"I feel as heavy as I can be.\"   HENT:  Negative for congestion and mouth sores.    Eyes:  Negative for redness and visual disturbance.   Respiratory:  Negative for shortness of breath and wheezing.    Cardiovascular:  Positive for leg swelling. Negative for chest pain.   Gastrointestinal:  Negative for abdominal pain, nausea and vomiting.   Endocrine: Negative for polydipsia and polyphagia.   Genitourinary:  Negative for dysuria and hematuria.   Musculoskeletal:  Negative for myalgias and neck stiffness.   Skin:  Positive for pallor.   Allergic/Immunologic: Positive for food allergies.   Neurological:  Negative for dizziness and speech difficulty.   Hematological:  Negative for adenopathy. Does not bruise/bleed easily.   Psychiatric/Behavioral:  Negative for agitation and confusion.        VITAL SIGNS: /60   Pulse 58   Temp 98.1 °F (36.7 °C)   Resp 18   Ht 157.5 cm (62\")   Wt 79.4 kg (175 lb 1.6 oz)   SpO2 94%   Breastfeeding No   BMI 32.03 kg/m²  Gained 2 pounds.   Pain Score    11/14/23 1403   PainSc: 0-No pain       PHYSICAL EXAMINATION:     Physical Exam  Vitals reviewed.   Constitutional:       Appearance: She is ill-appearing.      Comments: She arrived in the exam room in a wheelchair. \"To keep me from falling.\"   HENT:      Head: Normocephalic and atraumatic.   Eyes:      General: No scleral icterus.  Cardiovascular:      Rate and Rhythm: Normal rate.   Pulmonary:      Effort: No respiratory distress.      Breath sounds: No wheezing.      Comments: Port, right. No erythema.  Abdominal:      General: Bowel sounds are normal.      " Palpations: Abdomen is soft.   Musculoskeletal:         General: Swelling present.      Cervical back: Neck supple.   Skin:     Coloration: Skin is pale.   Neurological:      Mental Status: She is oriented to person, place, and time.   Psychiatric:         Mood and Affect: Mood normal.         Behavior: Behavior normal.         Thought Content: Thought content normal.         Judgment: Judgment normal.         LABS    Lab Results - Last 18 Months   Lab Units 11/14/23  1343 06/26/23  1327 01/25/23  1421 11/30/22  1438 09/28/22  1331 09/13/22  1926 09/01/22  1609 07/20/22  1351   HEMOGLOBIN g/dL 8.5* 9.6* 9.3* 10.3* 10.0* 9.4* 9.5* 9.9*   HEMATOCRIT % 27.0* 31.0* 30.1* 33.4* 30.8* 27.3* 29.4* 30.9*   MCV fL 107.1* 108.0* 106.0* 109.2* 108.5* 104.2* 110.1* 107.7*   WBC 10*3/mm3 5.93 5.92 5.85 5.95 6.01 7.03 6.0 8.24   RDW % 13.4 13.3 12.8 12.9 13.1 12.9 12.9 14.2   MPV fL 10.7 11.0 10.3 10.7 10.3 10.4 11.2 10.2   PLATELETS 10*3/mm3 156 141 164 142 170 145 74* 177   IMM GRAN % %  --   --   --   --   --  0.4  --   --    NEUTROS ABS 10*3/mm3 3.90 4.04 4.06 3.91 4.02 4.66 3.8 6.91   LYMPHS ABS 10*3/mm3 1.30 1.07 1.02 1.27 1.11 1.49 1.2  --    MONOS ABS 10*3/mm3 0.42 0.47 0.38 0.38 0.44 0.55 0.60  --    EOS ABS 10*3/mm3 0.25 0.29 0.33 0.32 0.39 0.27 0.30 0.16   BASOS ABS 10*3/mm3 0.03 0.02 0.04 0.05 0.04 0.03 0.00  --    IMMATURE GRANS (ABS) 10*3/mm3  --   --   --   --   --  0.03 0.0  --    NRBC /100 WBC  --   --   --   --   --  0.0  --   --    NEUTROPHIL % %  --   --   --   --   --   --   --  77.8*   MONOCYTES % %  --   --   --   --   --   --   --  2.0*   ATYP LYMPH % %  --   --   --   --   --   --   --  1.0   ANISOCYTOSIS   --   --   --   --   --   --   --  Slight/1+       Lab Results - Last 18 Months   Lab Units 11/14/23  1343 06/26/23  1327 01/25/23  1421 11/30/22  1438 09/28/22  1331 09/13/22  1926 09/01/22  1609   GLUCOSE mg/dL 155* 117* 141* 158* 165* 98 95   SODIUM mmol/L 138 137 137 138 135* 136 132*   POTASSIUM  "mmol/L 4.6 4.8 4.9 4.9 4.7 4.0 5.7*   TOTAL CO2 mmol/L  --   --   --   --   --   --  23   CO2 mmol/L 28.0 22.0 24.0 22.0 26.0 25.0  --    CHLORIDE mmol/L 101 101 101 105 98 99 98   ANION GAP mmol/L 9.0 14.0 12.0 11.0 11.0 12.0 11   CREATININE mg/dL 1.81* 1.78* 1.76* 1.43* 1.48* 1.43* 1.4*   BUN mg/dL 32* 34* 35* 25* 28* 18 23   BUN / CREAT RATIO  17.7 19.1 19.9 17.5 18.9 12.6  --    CALCIUM mg/dL 9.6 9.2 9.5 9.6 9.3 9.4 9.7   EGFR IF NONAFRICN AM   --   --   --   --   --   --  36*   ALK PHOS U/L 61 74 64  --  59 56 64   TOTAL PROTEIN g/dL 7.0 7.4 7.9  --  7.6 7.3 7.5   ALT (SGPT) U/L 9 8 6  --  10 10 10   AST (SGOT) U/L 17 17 12  --  16 17 18   BILIRUBIN mg/dL 0.2 0.2 0.2  --  0.3 0.3 <0.2   ALBUMIN g/dL 4.0 4.0 4.0  --  4.30 4.20 4.3   GLOBULIN gm/dL 3.0 3.4 3.9  --  3.3 3.1  --        No results for input(s): \"MSPIKE\", \"KAPPALAMB\", \"IGLFLC\", \"URICACID\", \"FREEKAPPAL\", \"CEA\", \"LDH\", \"REFLABREPO\" in the last 81537 hours.    Lab Results - Last 18 Months   Lab Units 06/26/23  1327 01/25/23  1421 11/30/22  1438 09/28/22  1331 07/20/22  1351   IRON mcg/dL 74 81 101 104 75   TIBC mcg/dL 307 299 361 331 317   IRON SATURATION (TSAT) % 24 27 28 31 24   FERRITIN ng/mL 982.70* 1,030.00* 941.50* 1,026.00* 1,063.00*       Dago Waqar Nunez reports a pain score of 0.          ASSESSMENT:  1.  Left breast cancer, upper outer quadrant.  Tumor size 1.1 cm.  Negative genetic testing.  AJCC stage: 1A (pT1c, snpN0, cM0)  Receptor status: Estrogen 87%, progesterone 47% and negative HER-2/gabriela.  Treatment status: Post left lumpectomy and sentinel lymph node biopsy.  Declined adjuvant radiation. She is on adjuvant letrozole.  2.  Macrocytic anemia from iron deficiency, B12 deficiency and history of chronic kidney disease Stage IIIa, GFR 56 mL/min on 09/03/2020.  3.   Iron deficiency. Intolerant to oral iron. IV iron as needed.  4.   Chronic kidney disease Stage IIIa, GFR 56 ml/min on 09/03/2020.  5.   Performance status of 3.    6.   " Osteoporosis.  Taking calcium and vitamin D. Start denosumab.  7.   Squamous cell cancer in situ, urethra.  Followed by Dr. Callahan  8.   Recurrent squamous cell carcinoma, vulva.  AJCC stage IIIA (T2, Nib, M0, G3).  Treatment status.  Had Mitomycin C and 5 FU D1 to D4 with radiation.  D29 - 32 chemo was cancelled due to poor performance status.          PLAN:  1.    Re:  Heme status.  Hemoglobin 8.5, hematocrit 27, and .1.    2.    Re:  Pre-office CMP.  GFR 27.8 from 28.6 from 29 from  35.7 from 37.2 from 40.1 from 36 from 35 ml/min.Magnesium 1.9. Phosphorus 3.  3.    Re:  Ferritin 913.5 from 982.7 from 1030 from 941.5 from 1026 from 785.9 from 469.4 and saturation 23 from 24 from 27 from 28 from 31 from 24 from 18 from 16%.    4.    Re: Note from Dr. Gladis Young on 9/7/2023. Seen for stricture of the urethra. Slightly less than 10Fr urethral stricture, just at the distal urethra/meatus. Completely obliterated vaginal introitus due to labial adhesions from radiation. Normal bladder with mild trabeculations on cystoscopy. Stricture dilated to 24Fr, 18Fr Platinum tip catheter placed over a wire.   5.    Re:  Note from Dr. Mills 9/12/2023. Seen for chronic kidney disease stage IIIb. Sen by Dr. Rosario 11/13/2023. .  6.   CBC with differential, ferritin and iron panel in 2 months.  7.   Epoetin alpha 40,000 units SQ wekly if hemoglobin below 10 and hematocrit below 30 to target a hemoglobin of 11 and hematocrit 33. Move CBC weekly if she starts epoetin alpha.  Monitor for elevated blood pressure  8.  Transfuse 1 unit packed RBCs if hemoglobin less than 7.  Premed Tylenol 500 mg p.o. and Benadryl 12.5 mg IV.  Lasix 20 mg IV push after a unit of blood.  Observe for transfusion reactions.  9.   Intolerant to oral iron (nausea, cramps, and constipation).  IV iron as needed.   10.  Advance Care Planning  ACP discussion was held with the patient during this visit. Patient has an  "advance directive in EMR which is still valid.   11.  eRx 8 mg po every 8 hours as needed for nausea/vomiting, # 60, 2 refills if needed.  12.  eRx letrozole 2.5 mg p.o. daily #90 with 3 refills if needed.  \"I take that at [facility].\"  Observe for hot flashes or worsening bone loss.  13.  Cervical/vaginal cytology per gynecology.  14.  Vitamin B12 1000 mcg IM monthly by [facility].  Continue to monitor for local irritation.  15.  Continue ongoing management per primary care physician and other specialists.  16.  Will not obtain breast tumor markers or Oncotype DX.  Patient is not a candidate for adjuvant chemotherapy.  17.  Flush port every 6 weeks  18.Plan of care discussed with patient and  spouse, Joseph.  Understanding expressed.  Patient agreeable to proceed.  19.  Next bone density 10/2025.  20.  Order denosumab 60 mg SQ every 6 months for osteoporosis. She is on letrozole. Observe for osteonecrosis of the jaw.   21.  Mammogram order per surgery.  22.  Return to the office in 4 months with CBC with differential, ferritin, iron panel, and CMP.           I have reviewed the assessment and plan and verified the accuracy of it. No changes to assessment and plan since the information was documented. Drake Stafford MD 11/14/23          I spent 31 total minutes, face-to-face, caring for Syndal today. Greater than 50% of this time involved counseling and/or coordination of care as documented within this note.              cc: (Shaheen Akbar MD )        (Annita Loaiza MD)        (Ulises Roche MD)        (Guillermina Rosario MD)        (Octavio Fernando MD)        Gilberto Mills MD        (Moustapha Callahan MD)        (Radha Marks MD)    "

## 2024-10-22 NOTE — PATIENT PROFILE ADULT. - AS [facility]
[Time Spent: ___ minutes] : I have spent [unfilled] minutes of time on the encounter which excludes teaching and separately reported services. (4) no impairment